# Patient Record
Sex: FEMALE | Race: WHITE | NOT HISPANIC OR LATINO | Employment: UNEMPLOYED | URBAN - METROPOLITAN AREA
[De-identification: names, ages, dates, MRNs, and addresses within clinical notes are randomized per-mention and may not be internally consistent; named-entity substitution may affect disease eponyms.]

---

## 2019-12-18 ENCOUNTER — OFFICE VISIT (OUTPATIENT)
Dept: OBSTETRICS AND GYNECOLOGY | Age: 29
End: 2019-12-18

## 2019-12-18 ENCOUNTER — PROCEDURE VISIT (OUTPATIENT)
Dept: OBSTETRICS AND GYNECOLOGY | Age: 29
End: 2019-12-18

## 2019-12-18 VITALS
HEIGHT: 63 IN | BODY MASS INDEX: 27.46 KG/M2 | WEIGHT: 155 LBS | DIASTOLIC BLOOD PRESSURE: 90 MMHG | SYSTOLIC BLOOD PRESSURE: 120 MMHG

## 2019-12-18 DIAGNOSIS — Z34.80 SUPERVISION OF OTHER NORMAL PREGNANCY, ANTEPARTUM: ICD-10-CM

## 2019-12-18 DIAGNOSIS — Z01.419 WELL WOMAN EXAM WITH ROUTINE GYNECOLOGICAL EXAM: Primary | ICD-10-CM

## 2019-12-18 DIAGNOSIS — O36.80X0 ENCOUNTER TO DETERMINE FETAL VIABILITY OF PREGNANCY, SINGLE OR UNSPECIFIED FETUS: Primary | ICD-10-CM

## 2019-12-18 PROBLEM — O09.299: Status: ACTIVE | Noted: 2019-12-18

## 2019-12-18 PROBLEM — O09.299 CURRENT SINGLETON PREGNANCY WITH HISTORY OF CONGENITAL ANOMALY IN PRIOR CHILD, ANTEPARTUM: Status: ACTIVE | Noted: 2019-12-18

## 2019-12-18 PROCEDURE — 99213 OFFICE O/P EST LOW 20 MIN: CPT | Performed by: NURSE PRACTITIONER

## 2019-12-18 PROCEDURE — 99385 PREV VISIT NEW AGE 18-39: CPT | Performed by: NURSE PRACTITIONER

## 2019-12-18 PROCEDURE — 76817 TRANSVAGINAL US OBSTETRIC: CPT | Performed by: OBSTETRICS & GYNECOLOGY

## 2019-12-18 RX ORDER — PRENATAL VIT NO.126/IRON/FOLIC 28MG-0.8MG
TABLET ORAL DAILY
COMMUNITY
End: 2020-08-25

## 2019-12-18 NOTE — PROGRESS NOTES
Subjective     Chief Complaint   Patient presents with   • Establish Care     New Patient. + HPT. US today.        History of Present Illness    Berry Daniels is a 29 y.o. female who presents for annual exam and confirmation of pregnancy as a new patient.     US confirms IUP at 9 weeks 3 days. . This is Berry's third pregnancy. Hx   and SAB - calling for 2013 delivery note. Berry reports the pregnancy as uncomplicated. She reports shoulder dystocia with the delivery. Her son was diagnosed at age 3 with congenital meckel's diverticulum.    Berry denies any medical hx. She is taking only a prenatal vitamin.   Her blood pressure is mildly elevated today- Berry denies hx of high blood pressure. She reports she is not feeling well today- nausea pretty bad. Declines phenergan. Recommend unisom and B6. Will monitor BP closely.     Obstetric History:  OB History        3    Para   1    Term   1            AB   1    Living   1       SAB   1    TAB        Ectopic        Molar        Multiple        Live Births   1               Menstrual History:     Patient's last menstrual period was 10/13/2019.         Social History    Tobacco Use      Smoking status: Former Smoker        Types: Electronic Cigarette      Smokeless tobacco: Never Used      Tobacco comment: 1 year ago quit cigs    Exercise: moderately active  Calcium/Vitamin D: adequate intake    The following portions of the patient's history were reviewed and updated as appropriate: allergies, current medications, past family history, past medical history, past social history, past surgical history and problem list.    Review of Systems   Constitutional: Negative.    HENT: Negative.    Eyes: Negative for visual disturbance.   Respiratory: Negative for cough, shortness of breath and wheezing.    Cardiovascular: Negative for chest pain, palpitations and leg swelling.   Gastrointestinal: Negative for abdominal distention, abdominal pain, blood in  "stool, constipation, diarrhea, nausea and vomiting.   Endocrine: Negative for cold intolerance and heat intolerance.   Genitourinary: Negative for difficulty urinating, dyspareunia, dysuria, frequency, genital sores, hematuria, menstrual problem, pelvic pain, urgency, vaginal bleeding, vaginal discharge and vaginal pain.   Musculoskeletal: Negative.    Skin: Negative.    Neurological: Negative for dizziness, weakness, light-headedness, numbness and headaches.   Hematological: Negative.    Psychiatric/Behavioral: Negative.    Breasts: negative for lumps skin changes, dimpling, swelling, nipple changes/discharge bilaterally         Objective   Physical Exam    /90   Ht 160 cm (63\")   Wt 70.3 kg (155 lb)   LMP 10/13/2019   BMI 27.46 kg/m²     General:   alert, appears stated age and cooperative       Heart: regular rate and rhythm, S1, S2 normal, no murmur, click, rub or gallop   Lungs: clear to auscultation bilaterally   Abdomen: soft, non-tender, without masses or organomegaly   Breast: inspection negative, no nipple discharge or bleeding, no masses or nodularity palpable   Vulva: normal   Vagina: normal mucosa, normal discharge   Cervix: no cervical motion tenderness and no lesions   Uterus: size consistent with 9 weeks   Adnexa: no mass, fullness, tenderness         Assessment/Plan   Berry was seen today for establish care.    Diagnoses and all orders for this visit:    Well woman exam with routine gynecological exam    Supervision of other normal pregnancy, antepartum  -     ABO / Rh  -     Antibody Screen  -     CBC (No Diff)  -     Hemoglobin A1c  -     Hepatitis C Antibody  -     Hepatitis B Surface Antigen  -     Hgb. Frac. Profile  -     HIV-1 / O / 2 Ag / Antibody 4th Generation  -     RPR  -     Rubella Antibody, IgG  -     Varicella Zoster Antibody, IgG  -     Urine Culture - Urine, Urine, Clean Catch  -     Comprehensive Metabolic Panel        All questions answered.  Await pap smear " results.  Breast self exam technique reviewed and patient encouraged to perform self-exam monthly.  Discussed healthy lifestyle modifications.   Prenatal labs and pap today.    Return for BP check in 2 weeks.   OB intake 4 weeks.    MARILEE Torre

## 2019-12-19 LAB
ABO GROUP BLD: NORMAL
ALBUMIN SERPL-MCNC: 4.7 G/DL (ref 3.5–5.5)
ALBUMIN/GLOB SERPL: 1.8 {RATIO} (ref 1.2–2.2)
ALP SERPL-CCNC: 50 IU/L (ref 39–117)
ALT SERPL-CCNC: 7 IU/L (ref 0–32)
AST SERPL-CCNC: 15 IU/L (ref 0–40)
BACTERIA UR CULT: NO GROWTH
BACTERIA UR CULT: NORMAL
BILIRUB SERPL-MCNC: 0.4 MG/DL (ref 0–1.2)
BLD GP AB SCN SERPL QL: NEGATIVE
BUN SERPL-MCNC: 10 MG/DL (ref 6–20)
BUN/CREAT SERPL: 15 (ref 9–23)
CALCIUM SERPL-MCNC: 9.4 MG/DL (ref 8.7–10.2)
CHLORIDE SERPL-SCNC: 100 MMOL/L (ref 96–106)
CO2 SERPL-SCNC: 20 MMOL/L (ref 20–29)
CREAT SERPL-MCNC: 0.65 MG/DL (ref 0.57–1)
ERYTHROCYTE [DISTWIDTH] IN BLOOD BY AUTOMATED COUNT: 13.7 % (ref 12.3–15.4)
GLOBULIN SER CALC-MCNC: 2.6 G/DL (ref 1.5–4.5)
GLUCOSE SERPL-MCNC: 77 MG/DL (ref 65–99)
HBA1C MFR BLD: 5.3 % (ref 4.8–5.6)
HBV SURFACE AG SERPL QL IA: NEGATIVE
HCT VFR BLD AUTO: 39.1 % (ref 34–46.6)
HCV AB S/CO SERPL IA: <0.1 S/CO RATIO (ref 0–0.9)
HGB A MFR BLD: 97.5 % (ref 96.4–98.8)
HGB A2 MFR BLD COLUMN CHROM: 1.9 % (ref 1.8–3.2)
HGB BLD-MCNC: 13.3 G/DL (ref 11.1–15.9)
HGB C MFR BLD: 0 %
HGB F MFR BLD: 0.6 % (ref 0–2)
HGB FRACT BLD-IMP: NORMAL
HGB S BLD QL SOLY: NEGATIVE
HGB S MFR BLD: 0 %
HIV 1+2 AB+HIV1 P24 AG SERPL QL IA: NON REACTIVE
MCH RBC QN AUTO: 31 PG (ref 26.6–33)
MCHC RBC AUTO-ENTMCNC: 34 G/DL (ref 31.5–35.7)
MCV RBC AUTO: 91 FL (ref 79–97)
PLATELET # BLD AUTO: 279 X10E3/UL (ref 150–450)
POTASSIUM SERPL-SCNC: 3.7 MMOL/L (ref 3.5–5.2)
PROT SERPL-MCNC: 7.3 G/DL (ref 6–8.5)
RBC # BLD AUTO: 4.29 X10E6/UL (ref 3.77–5.28)
RH BLD: POSITIVE
RPR SER QL: REACTIVE
RPR SER-TITR: ABNORMAL {TITER}
RUBV IGG SERPL IA-ACNC: 10.4 INDEX
SODIUM SERPL-SCNC: 137 MMOL/L (ref 134–144)
VZV IGG SER IA-ACNC: 256 INDEX
WBC # BLD AUTO: 11.6 X10E3/UL (ref 3.4–10.8)

## 2019-12-20 DIAGNOSIS — A53.0 POSITIVE RPR TEST: Primary | ICD-10-CM

## 2019-12-20 PROBLEM — R76.8 BIOLOGICAL FALSE POSITIVE RPR TEST: Status: ACTIVE | Noted: 2019-12-20

## 2019-12-20 LAB
A VAGINAE DNA VAG QL NAA+PROBE: ABNORMAL SCORE
BVAB2 DNA VAG QL NAA+PROBE: ABNORMAL SCORE
C ALBICANS DNA VAG QL NAA+PROBE: NEGATIVE
C GLABRATA DNA VAG QL NAA+PROBE: NEGATIVE
C TRACH RRNA SPEC QL NAA+PROBE: NEGATIVE
MEGA1 DNA VAG QL NAA+PROBE: ABNORMAL SCORE
N GONORRHOEA RRNA SPEC QL NAA+PROBE: NEGATIVE
T VAGINALIS RRNA SPEC QL NAA+PROBE: NEGATIVE

## 2019-12-21 LAB
CONV .: NORMAL
CYTOLOGIST CVX/VAG CYTO: NORMAL
CYTOLOGY CVX/VAG DOC CYTO: NORMAL
CYTOLOGY CVX/VAG DOC THIN PREP: NORMAL
DX ICD CODE: NORMAL
HIV 1 & 2 AB SER-IMP: NORMAL
OTHER STN SPEC: NORMAL
STAT OF ADQ CVX/VAG CYTO-IMP: NORMAL

## 2019-12-23 ENCOUNTER — TELEPHONE (OUTPATIENT)
Dept: OBSTETRICS AND GYNECOLOGY | Age: 29
End: 2019-12-23

## 2019-12-23 PROBLEM — N76.0 BV (BACTERIAL VAGINOSIS): Status: ACTIVE | Noted: 2019-12-23

## 2019-12-23 PROBLEM — B96.89 BV (BACTERIAL VAGINOSIS): Status: ACTIVE | Noted: 2019-12-23

## 2019-12-23 RX ORDER — METRONIDAZOLE 500 MG/1
500 TABLET ORAL 2 TIMES DAILY
Qty: 14 TABLET | Refills: 0 | Status: SHIPPED | OUTPATIENT
Start: 2019-12-23 | End: 2019-12-30

## 2019-12-23 NOTE — TELEPHONE ENCOUNTER
Pt returned call. Informed of BV on culture. Flagyl sent to pharm. Advised no alcohol.  Also discussed need for additional labs- pt scheduled for 12/27.

## 2019-12-23 NOTE — TELEPHONE ENCOUNTER
Attempted to call patient with lab results- tried both numbers in chart- voicemail box not set up on either phone.

## 2019-12-25 ENCOUNTER — RESULTS ENCOUNTER (OUTPATIENT)
Dept: OBSTETRICS AND GYNECOLOGY | Age: 29
End: 2019-12-25

## 2019-12-25 DIAGNOSIS — A53.0 POSITIVE RPR TEST: ICD-10-CM

## 2019-12-31 LAB
APTT SCREEN TO CONFIRM RATIO: 0.95 RATIO (ref 0–1.4)
CARDIOLIPIN IGG SER IA-ACNC: <9 GPL U/ML (ref 0–14)
CARDIOLIPIN IGM SER IA-ACNC: <9 MPL U/ML (ref 0–12)
CONFIRM APTT/NORMAL: 28.4 SEC (ref 0–55)
ENA SS-A AB SER-ACNC: <0.2 AI (ref 0–0.9)
ENA SS-B AB SER-ACNC: <0.2 AI (ref 0–0.9)
LA 2 SCREEN W REFLEX-IMP: NORMAL
SCREEN APTT: 30.4 SEC (ref 0–51.9)
SCREEN DRVVT: 26.9 SEC (ref 0–47)
T PALLIDUM AB SER QL IF: ABNORMAL
THROMBIN TIME: 15.4 SEC (ref 0–23)

## 2020-01-02 ENCOUNTER — TELEPHONE (OUTPATIENT)
Dept: OBSTETRICS AND GYNECOLOGY | Age: 30
End: 2020-01-02

## 2020-01-02 NOTE — TELEPHONE ENCOUNTER
Returned Leola Estrada's call [facility]. Advised her that the patient will be having repeat labs tomorrow. Leola states she will follow up with us regarding those labs next week.

## 2020-01-02 NOTE — TELEPHONE ENCOUNTER
[facility] Dept.calling to find out if patient was treated for RPR and if she had a previous exposure?.Please advise. They can be reached @[phone],Ask for Leola Estrada.

## 2020-01-02 NOTE — TELEPHONE ENCOUNTER
Discussed recent labs results in detail and meaning of reactive RPR with such low titer. FTA is equivocal so RPR and FTA need to be repeated. She will get this done at her appointment tomorrow. Confirmed she has no past history or exposure. Her questions were answered.

## 2020-01-03 ENCOUNTER — OFFICE VISIT (OUTPATIENT)
Dept: OBSTETRICS AND GYNECOLOGY | Age: 30
End: 2020-01-03

## 2020-01-03 VITALS
SYSTOLIC BLOOD PRESSURE: 120 MMHG | WEIGHT: 162 LBS | DIASTOLIC BLOOD PRESSURE: 80 MMHG | BODY MASS INDEX: 28.7 KG/M2 | HEIGHT: 63 IN

## 2020-01-03 DIAGNOSIS — Z3A.11 11 WEEKS GESTATION OF PREGNANCY: Primary | ICD-10-CM

## 2020-01-03 DIAGNOSIS — A53.0 POSITIVE RPR TEST: ICD-10-CM

## 2020-01-03 PROCEDURE — 99212 OFFICE O/P EST SF 10 MIN: CPT | Performed by: NURSE PRACTITIONER

## 2020-01-03 NOTE — PROGRESS NOTES
Subjective   Berry Daniels is a 29 y.o. female.     History of Present Illness     Berry presents for follow up +RPR with equivocal FTA. Labs will be repeated today. Counseling provided and Berry's questions were answered.     No complaints or concerns today.  BP WNL.   on doppler     The following portions of the patient's history were reviewed and updated as appropriate: allergies, current medications, past family history, past medical history, past social history, past surgical history and problem list.    Review of Systems   Constitutional: Negative for chills, fatigue and fever.   Gastrointestinal: Negative for abdominal distention and abdominal pain.   Genitourinary: Negative for dysuria, frequency, genital sores, hematuria, menstrual problem, pelvic pain, pelvic pressure, urgency, vaginal bleeding, vaginal discharge and vaginal pain.       Objective   Physical Exam   Constitutional: She is oriented to person, place, and time. She appears well-developed and well-nourished. No distress.   Neurological: She is alert and oriented to person, place, and time.   Skin: Skin is warm and dry.   Psychiatric: She has a normal mood and affect. Her behavior is normal.       Assessment/Plan   Berry was seen today for follow-up.    Diagnoses and all orders for this visit:    11 weeks gestation of pregnancy    Positive RPR test  -     RPR  -     T Pallidum Antibody (FTA-Ab)      Call Monday for results. Follow up 2 weeks for OB intake appointment.    MARILEE Torre

## 2020-01-07 LAB
RPR SER QL: NORMAL
T PALLIDUM AB SER QL IF: NON REACTIVE

## 2020-01-08 ENCOUNTER — TELEPHONE (OUTPATIENT)
Dept: OBSTETRICS AND GYNECOLOGY | Age: 30
End: 2020-01-08

## 2020-01-13 ENCOUNTER — TELEPHONE (OUTPATIENT)
Dept: OBSTETRICS AND GYNECOLOGY | Age: 30
End: 2020-01-13

## 2020-01-13 NOTE — TELEPHONE ENCOUNTER
PT CALLED AND STATES SHE HAS HAD SOME LIGHT SPOTTING. LIGHT PINK. NOT HEAVY. STATES NO CRAMPING. SHE SAID SHE HAS HAD MISCARRIAGE IN THE PAST. ADVISED HER TO GO TO [facility] ER. STATES SHE IS GOING TO REST AND WILL GO THERE IF IT CONTINUES OR GETS WORSE.  CIARAN

## 2020-01-22 ENCOUNTER — INITIAL PRENATAL (OUTPATIENT)
Dept: OBSTETRICS AND GYNECOLOGY | Age: 30
End: 2020-01-22

## 2020-01-22 VITALS — DIASTOLIC BLOOD PRESSURE: 78 MMHG | WEIGHT: 159 LBS | SYSTOLIC BLOOD PRESSURE: 112 MMHG | BODY MASS INDEX: 28.17 KG/M2

## 2020-01-22 DIAGNOSIS — O09.299: ICD-10-CM

## 2020-01-22 DIAGNOSIS — O46.8X1 SUBCHORIONIC HEMORRHAGE OF PLACENTA IN FIRST TRIMESTER, SINGLE OR UNSPECIFIED FETUS: ICD-10-CM

## 2020-01-22 DIAGNOSIS — O41.8X10 SUBCHORIONIC HEMORRHAGE OF PLACENTA IN FIRST TRIMESTER, SINGLE OR UNSPECIFIED FETUS: ICD-10-CM

## 2020-01-22 DIAGNOSIS — Z13.79 ENCOUNTER FOR GENETIC SCREENING FOR DOWN SYNDROME: ICD-10-CM

## 2020-01-22 DIAGNOSIS — Z3A.14 14 WEEKS GESTATION OF PREGNANCY: Primary | ICD-10-CM

## 2020-01-22 DIAGNOSIS — O09.299 HISTORY OF SHOULDER DYSTOCIA IN PRIOR PREGNANCY, CURRENTLY PREGNANT: ICD-10-CM

## 2020-01-22 DIAGNOSIS — Z34.80 SUPERVISION OF OTHER NORMAL PREGNANCY, ANTEPARTUM: ICD-10-CM

## 2020-01-22 DIAGNOSIS — R30.0 DYSURIA: ICD-10-CM

## 2020-01-22 DIAGNOSIS — Z13.89 SCREENING FOR BLOOD OR PROTEIN IN URINE: ICD-10-CM

## 2020-01-22 DIAGNOSIS — O09.299 CURRENT SINGLETON PREGNANCY WITH HISTORY OF CONGENITAL ANOMALY IN PRIOR CHILD, ANTEPARTUM: ICD-10-CM

## 2020-01-22 DIAGNOSIS — A53.0 POSITIVE RPR TEST: ICD-10-CM

## 2020-01-22 PROBLEM — B96.89 BV (BACTERIAL VAGINOSIS): Status: RESOLVED | Noted: 2019-12-23 | Resolved: 2020-01-22

## 2020-01-22 PROBLEM — N76.0 BV (BACTERIAL VAGINOSIS): Status: RESOLVED | Noted: 2019-12-23 | Resolved: 2020-01-22

## 2020-01-22 LAB
BILIRUB BLD-MCNC: NEGATIVE MG/DL
CLARITY, POC: CLEAR
COLOR UR: YELLOW
EXTERNAL CYSTIC FIBROSIS: NEGATIVE
EXTERNAL NIPT: NEGATIVE
GLUCOSE UR STRIP-MCNC: NEGATIVE MG/DL
KETONES UR QL: ABNORMAL
LEUKOCYTE EST, POC: ABNORMAL
NITRITE UR-MCNC: NEGATIVE MG/ML
PH UR: 6 [PH] (ref 5–8)
PROT UR STRIP-MCNC: ABNORMAL MG/DL
RBC # UR STRIP: ABNORMAL /UL
SP GR UR: 1.03 (ref 1–1.03)
UROBILINOGEN UR QL: NORMAL

## 2020-01-22 PROCEDURE — 99214 OFFICE O/P EST MOD 30 MIN: CPT | Performed by: OBSTETRICS & GYNECOLOGY

## 2020-01-22 NOTE — PROGRESS NOTES
Chief Complaint   Patient presents with   • Routine Prenatal Visit     OB INTAKE.  Called last week  for spotting, passed tissue??  Lasted day of call and next morning.  This morning noticed spotting again.  Questioned UTI, since Saturday has been having bladder spasms, odor, painful urination, not empyting. Symptoms come and go.        HPI: 29 y.o.  at 14w3d with complaint of intermittent moderate dark brown bleeding for the past week.  The patient was previously diagnosed with a small subchorionic hemorrhage.    The patient reports a significant shoulder dystocia with her prior delivery.  The infant suffered from a fractured clavicle as a result of the delivery.  We discussed the biggest predictor of recurrent shoulder dystocia being previous shoulder dystocia.   delivery is recommended at term    The patient is also reporting intermittent moderate symptoms of  dysuria of the past few days.              Vitals:    20 1050   BP: 112/78   Weight: 72.1 kg (159 lb)       ROS:  GI:  Negative  : dysuria and spotting   Pulmonary: Negative     A/P  1. Intrauterine pregnancy at 14w3d   2. Pregnancy Risk:  NORMAL    Berry was seen today for routine prenatal visit.    Diagnoses and all orders for this visit:    14 weeks gestation of pregnancy  -     POC Urinalysis Dipstick    Screening for blood or protein in urine  -     POC Urinalysis Dipstick    Positive RPR test    Supervision of other normal pregnancy, antepartum    Current mcclellan pregnancy with history of congenital anomaly in prior child, antepartum    Hx of shoulder dystocia, prior pregnancy, currently pregnant    History of shoulder dystocia in prior pregnancy, currently pregnant    Subchorionic hemorrhage of placenta in first trimester, single or unspecified fetus    Delivery by planned , 37-39 wks due to labor, with postp compl    Encounter for genetic screening for Down Syndrome    Dysuria      Discussed previous shoulder  dystocia.  Strongly recommend  at term.  For the symptoms of dysuria urine culture will be sent.  Discussed subchorionic hemorrhage at length.  Discussed false positive RPR and negative testing for other diseases.      -----------------------  PLAN:   Return in about 4 weeks (around 2020) for Anatomic survey and OB check.      Oliverio Estrada MD  2020 11:14 AM

## 2020-01-25 LAB
BACTERIA UR CULT: ABNORMAL
BACTERIA UR CULT: ABNORMAL
OTHER ANTIBIOTIC SUSC ISLT: ABNORMAL

## 2020-01-25 RX ORDER — NITROFURANTOIN 25; 75 MG/1; MG/1
100 CAPSULE ORAL 2 TIMES DAILY
Qty: 10 CAPSULE | Refills: 0 | Status: SHIPPED | OUTPATIENT
Start: 2020-01-25 | End: 2020-01-30

## 2020-01-27 ENCOUNTER — TELEPHONE (OUTPATIENT)
Dept: OBSTETRICS AND GYNECOLOGY | Age: 30
End: 2020-01-27

## 2020-01-27 NOTE — TELEPHONE ENCOUNTER
----- Message from Oliverio Estrada MD sent at 1/25/2020  8:32 AM EST -----  Notify patient:  Urine culture is positive.  Antibiotic sent to pharmacy

## 2020-01-27 NOTE — TELEPHONE ENCOUNTER
Pt notified of results and understanding verbalized.  Urine culture is positive.  Antibiotic sent to pharmacy.

## 2020-01-28 ENCOUNTER — TELEPHONE (OUTPATIENT)
Dept: OBSTETRICS AND GYNECOLOGY | Age: 30
End: 2020-01-28

## 2020-01-29 ENCOUNTER — TELEPHONE (OUTPATIENT)
Dept: OBSTETRICS AND GYNECOLOGY | Age: 30
End: 2020-01-29

## 2020-01-29 PROBLEM — Z13.79 GENETIC SCREENING: Status: ACTIVE | Noted: 2020-01-29

## 2020-02-19 ENCOUNTER — PROCEDURE VISIT (OUTPATIENT)
Dept: OBSTETRICS AND GYNECOLOGY | Age: 30
End: 2020-02-19

## 2020-02-19 ENCOUNTER — ROUTINE PRENATAL (OUTPATIENT)
Dept: OBSTETRICS AND GYNECOLOGY | Age: 30
End: 2020-02-19

## 2020-02-19 VITALS — WEIGHT: 164 LBS | DIASTOLIC BLOOD PRESSURE: 66 MMHG | SYSTOLIC BLOOD PRESSURE: 104 MMHG | BODY MASS INDEX: 29.05 KG/M2

## 2020-02-19 DIAGNOSIS — Z36.86 ENCOUNTER FOR ANTENATAL SCREENING FOR CERVICAL LENGTH: ICD-10-CM

## 2020-02-19 DIAGNOSIS — Z34.80 SUPERVISION OF OTHER NORMAL PREGNANCY, ANTEPARTUM: Primary | ICD-10-CM

## 2020-02-19 DIAGNOSIS — Z36.89 SCREENING, ANTENATAL, FOR FETAL ANATOMIC SURVEY: Primary | ICD-10-CM

## 2020-02-19 DIAGNOSIS — Z13.89 SCREENING FOR BLOOD OR PROTEIN IN URINE: ICD-10-CM

## 2020-02-19 DIAGNOSIS — B96.20 E-COLI UTI: ICD-10-CM

## 2020-02-19 DIAGNOSIS — N39.0 E-COLI UTI: ICD-10-CM

## 2020-02-19 PROBLEM — O44.42 LOW-LYING PLACENTA IN SECOND TRIMESTER: Status: ACTIVE | Noted: 2020-02-19

## 2020-02-19 PROBLEM — IMO0002 EVALUATE ANATOMY NOT SEEN ON PRIOR SONOGRAM: Status: ACTIVE | Noted: 2020-02-19

## 2020-02-19 LAB
BILIRUB BLD-MCNC: NEGATIVE MG/DL
CLARITY, POC: CLEAR
COLOR UR: YELLOW
GLUCOSE UR STRIP-MCNC: NEGATIVE MG/DL
KETONES UR QL: NEGATIVE
LEUKOCYTE EST, POC: ABNORMAL
NITRITE UR-MCNC: NEGATIVE MG/ML
PH UR: 7.5 [PH] (ref 5–8)
PROT UR STRIP-MCNC: NEGATIVE MG/DL
RBC # UR STRIP: ABNORMAL /UL
SP GR UR: 1.02 (ref 1–1.03)
UROBILINOGEN UR QL: NORMAL

## 2020-02-19 PROCEDURE — 99213 OFFICE O/P EST LOW 20 MIN: CPT | Performed by: NURSE PRACTITIONER

## 2020-02-19 PROCEDURE — 76805 OB US >/= 14 WKS SNGL FETUS: CPT | Performed by: OBSTETRICS & GYNECOLOGY

## 2020-02-19 PROCEDURE — 76817 TRANSVAGINAL US OBSTETRIC: CPT | Performed by: OBSTETRICS & GYNECOLOGY

## 2020-02-19 NOTE — PROGRESS NOTES
Chief Complaint   Patient presents with   • Routine Prenatal Visit     Some spotting this past Monday which has happened before. Nothing since.        HPI: 29 y.o.  at 18w3d presents for routine OB visit.     Anatomy scan normal but incomplete today. CL= 5.6. Low lying placenta.   Berry reports one more episode of light spotting several days ago that has since resolved.  Recommend pelvic rest at this time.  Discussed AFP screening today for open neural tube defects and Berry desires this screening.    Berry reports compliance with macrobid therapy and symptoms of UTI have resolved- will recheck urine culture.    Vitals:    20 0910   BP: 104/66   Weight: 74.4 kg (164 lb)       ROS:  GI:  Negative  : Negative  Pulmonary: Negative     A/P  1. Intrauterine pregnancy at 18w3d   2. Pregnancy Risk:  COMPLICATED    Berry was seen today for routine prenatal visit.    Diagnoses and all orders for this visit:    Supervision of other normal pregnancy, antepartum    Screening for blood or protein in urine  -     POC Urinalysis Dipstick    E-coli UTI  -     Urine Culture - Urine, Urine, Clean Catch      PLAN:     4 week OB visit  Call for further episodes of bleeding or other changes/concerns      Corina Jackson, APRN  2020 9:17 AM

## 2020-02-21 LAB
BACTERIA UR CULT: NO GROWTH
BACTERIA UR CULT: NORMAL

## 2020-02-22 LAB
AFP ADJ MOM SERPL: 0.81
AFP INTERP SERPL-IMP: NORMAL
AFP INTERP SERPL-IMP: NORMAL
AFP SERPL-MCNC: 33.1 NG/ML
AGE AT DELIVERY: 29.3 YR
GA METHOD: NORMAL
GA: 18 WEEKS
IDDM PATIENT QL: NO
LABORATORY COMMENT REPORT: NORMAL
MULTIPLE PREGNANCY: NO
NEURAL TUBE DEFECT RISK FETUS: NORMAL %
RESULT: NORMAL

## 2020-02-24 ENCOUNTER — TELEPHONE (OUTPATIENT)
Dept: OBSTETRICS AND GYNECOLOGY | Age: 30
End: 2020-02-24

## 2020-02-24 NOTE — TELEPHONE ENCOUNTER
----- Message from MARILEE Louie sent at 2/24/2020  8:25 AM EST -----  Please notify patient her AFP screening for open neural tube defects returned negative.

## 2020-03-13 ENCOUNTER — TELEPHONE (OUTPATIENT)
Dept: OBSTETRICS AND GYNECOLOGY | Age: 30
End: 2020-03-13

## 2020-03-13 RX ORDER — ONDANSETRON HYDROCHLORIDE 8 MG/1
8 TABLET, FILM COATED ORAL EVERY 8 HOURS PRN
Qty: 15 TABLET | Refills: 1 | Status: SHIPPED | OUTPATIENT
Start: 2020-03-13 | End: 2020-07-14 | Stop reason: HOSPADM

## 2020-03-13 NOTE — TELEPHONE ENCOUNTER
Patient called and has been unable to keep anything down for 2 days.  Advised patient that if vomiting doesn't stop she would need to go to labor and delivery to be evaluated.  She is wanting to know if she can get a refill of Zofran sent over to her pharmacy.

## 2020-03-18 ENCOUNTER — PROCEDURE VISIT (OUTPATIENT)
Dept: OBSTETRICS AND GYNECOLOGY | Age: 30
End: 2020-03-18

## 2020-03-18 ENCOUNTER — ROUTINE PRENATAL (OUTPATIENT)
Dept: OBSTETRICS AND GYNECOLOGY | Age: 30
End: 2020-03-18

## 2020-03-18 VITALS — DIASTOLIC BLOOD PRESSURE: 66 MMHG | WEIGHT: 171 LBS | BODY MASS INDEX: 30.29 KG/M2 | SYSTOLIC BLOOD PRESSURE: 106 MMHG

## 2020-03-18 DIAGNOSIS — Z34.80 SUPERVISION OF OTHER NORMAL PREGNANCY, ANTEPARTUM: Primary | ICD-10-CM

## 2020-03-18 DIAGNOSIS — Z13.89 SCREENING FOR BLOOD OR PROTEIN IN URINE: ICD-10-CM

## 2020-03-18 DIAGNOSIS — O44.42 LOW-LYING PLACENTA IN SECOND TRIMESTER: ICD-10-CM

## 2020-03-18 DIAGNOSIS — O44.42 LOW-LYING PLACENTA IN SECOND TRIMESTER: Primary | ICD-10-CM

## 2020-03-18 DIAGNOSIS — IMO0002 EVALUATE ANATOMY NOT SEEN ON PRIOR SONOGRAM: ICD-10-CM

## 2020-03-18 PROBLEM — O26.852 SPOTTING AFFECTING PREGNANCY IN SECOND TRIMESTER: Status: ACTIVE | Noted: 2020-03-18

## 2020-03-18 LAB
BILIRUB BLD-MCNC: NEGATIVE MG/DL
CLARITY, POC: CLEAR
COLOR UR: YELLOW
GLUCOSE UR STRIP-MCNC: NEGATIVE MG/DL
KETONES UR QL: NEGATIVE
LEUKOCYTE EST, POC: NEGATIVE
NITRITE UR-MCNC: NEGATIVE MG/ML
PH UR: 8.5 [PH] (ref 5–8)
PROT UR STRIP-MCNC: NEGATIVE MG/DL
RBC # UR STRIP: NEGATIVE /UL
SP GR UR: 1.02 (ref 1–1.03)
UROBILINOGEN UR QL: NORMAL

## 2020-03-18 PROCEDURE — 76816 OB US FOLLOW-UP PER FETUS: CPT | Performed by: OBSTETRICS & GYNECOLOGY

## 2020-03-18 PROCEDURE — 76817 TRANSVAGINAL US OBSTETRIC: CPT | Performed by: OBSTETRICS & GYNECOLOGY

## 2020-03-18 PROCEDURE — 99213 OFFICE O/P EST LOW 20 MIN: CPT | Performed by: NURSE PRACTITIONER

## 2020-03-18 NOTE — PROGRESS NOTES
Chief Complaint   Patient presents with   • Routine Prenatal Visit     Pt states some light pink spotting this morning but denies cramping.        HPI: 30 y.o.  at 22w3d presents for routine OB visit.    Berry c/o one episode of light spotting this am with wiping. Denies cramping, pain. Feeling well otherwise.   US obtained. Anatomy normal and complete today. Cervical length appears normal at 3.9 cm with a visible lower uterine contraction. Dr. Estrada discussed findings with patient. Plan to repeat US cervical length 1 week.      Vitals:    20 1111   BP: 106/66   Weight: 77.6 kg (171 lb)       ROS: negative    A/P  1. Intrauterine pregnancy at 22w3d   2. Pregnancy Risk:  COMPLICATED    Berry was seen today for routine prenatal visit.    Diagnoses and all orders for this visit:    Supervision of other normal pregnancy, antepartum    Screening for blood or protein in urine  -     POC Urinalysis Dipstick    Low-lying placenta in second trimester        PLAN:     Dr. Estrada aware of case and recommends repeat US 1 week.   Berry is advised to call with any changes or concerns or recurrent spotting.  She will push fluids and continue pelvic rest    Corina Jackson, APRN  3/18/2020 14:33

## 2020-03-25 ENCOUNTER — PROCEDURE VISIT (OUTPATIENT)
Dept: OBSTETRICS AND GYNECOLOGY | Age: 30
End: 2020-03-25

## 2020-03-25 ENCOUNTER — ROUTINE PRENATAL (OUTPATIENT)
Dept: OBSTETRICS AND GYNECOLOGY | Age: 30
End: 2020-03-25

## 2020-03-25 VITALS — BODY MASS INDEX: 29.94 KG/M2 | SYSTOLIC BLOOD PRESSURE: 102 MMHG | WEIGHT: 169 LBS | DIASTOLIC BLOOD PRESSURE: 76 MMHG

## 2020-03-25 DIAGNOSIS — O26.852 SPOTTING AFFECTING PREGNANCY IN SECOND TRIMESTER: Primary | ICD-10-CM

## 2020-03-25 DIAGNOSIS — O26.899 CRAMPING AFFECTING PREGNANCY, ANTEPARTUM: ICD-10-CM

## 2020-03-25 DIAGNOSIS — Z13.89 SCREENING FOR BLOOD OR PROTEIN IN URINE: ICD-10-CM

## 2020-03-25 DIAGNOSIS — Z3A.23 23 WEEKS GESTATION OF PREGNANCY: Primary | ICD-10-CM

## 2020-03-25 DIAGNOSIS — R10.9 CRAMPING AFFECTING PREGNANCY, ANTEPARTUM: ICD-10-CM

## 2020-03-25 DIAGNOSIS — Z34.80 SUPERVISION OF OTHER NORMAL PREGNANCY, ANTEPARTUM: ICD-10-CM

## 2020-03-25 PROBLEM — IMO0002 EVALUATE ANATOMY NOT SEEN ON PRIOR SONOGRAM: Status: RESOLVED | Noted: 2020-02-19 | Resolved: 2020-03-25

## 2020-03-25 PROBLEM — O44.42 LOW-LYING PLACENTA IN SECOND TRIMESTER: Status: RESOLVED | Noted: 2020-02-19 | Resolved: 2020-03-25

## 2020-03-25 LAB
BILIRUB BLD-MCNC: NEGATIVE MG/DL
CLARITY, POC: CLEAR
COLOR UR: YELLOW
GLUCOSE UR STRIP-MCNC: NEGATIVE MG/DL
KETONES UR QL: ABNORMAL
LEUKOCYTE EST, POC: ABNORMAL
NITRITE UR-MCNC: NEGATIVE MG/ML
PH UR: 8.5 [PH] (ref 5–8)
PROT UR STRIP-MCNC: NEGATIVE MG/DL
RBC # UR STRIP: ABNORMAL /UL
SP GR UR: 1.02 (ref 1–1.03)
UROBILINOGEN UR QL: NORMAL

## 2020-03-25 PROCEDURE — 99213 OFFICE O/P EST LOW 20 MIN: CPT | Performed by: OBSTETRICS & GYNECOLOGY

## 2020-03-25 PROCEDURE — 76817 TRANSVAGINAL US OBSTETRIC: CPT | Performed by: OBSTETRICS & GYNECOLOGY

## 2020-03-25 NOTE — PROGRESS NOTES
Chief Complaint   Patient presents with   • Routine Prenatal Visit     Cramping over the last week, no spotting.        HPI: 30 y.o.  at 23w3d presents for scheduled follow-up with complaint of intermittent mostly mild lower abdominal cramping provoked by activity 2-3 times a day with episodes fairly widely spaced.  Repeat ultrasound today shows a stable cervical length with contractions of the lower uterine segment noted.    Vitals:    20 1334   BP: 102/76   Weight: 76.7 kg (169 lb)       ROS:   Gen:  neg  GI: neg  CV: neg  Pul: neg   pelvic cramps   Neuro: neg    A/P  1. Intrauterine pregnancy at 23w3d   2. Pregnancy Risk:  NORMAL    Berry was seen today for routine prenatal visit.    Diagnoses and all orders for this visit:    Screening for blood or protein in urine  -     POC Urinalysis Dipstick    23 weeks gestation of pregnancy  -     POC Urinalysis Dipstick    Supervision of other normal pregnancy, antepartum        -----------------------  PLAN:   No follow-ups on file.      Oliverio Estrada MD  3/25/2020 13:46

## 2020-04-08 ENCOUNTER — ROUTINE PRENATAL (OUTPATIENT)
Dept: OBSTETRICS AND GYNECOLOGY | Age: 30
End: 2020-04-08

## 2020-04-08 VITALS — DIASTOLIC BLOOD PRESSURE: 64 MMHG | SYSTOLIC BLOOD PRESSURE: 100 MMHG | WEIGHT: 172 LBS | BODY MASS INDEX: 30.47 KG/M2

## 2020-04-08 DIAGNOSIS — Z13.89 SCREENING FOR BLOOD OR PROTEIN IN URINE: ICD-10-CM

## 2020-04-08 DIAGNOSIS — Z34.80 SUPERVISION OF OTHER NORMAL PREGNANCY, ANTEPARTUM: ICD-10-CM

## 2020-04-08 DIAGNOSIS — O26.852 SPOTTING AFFECTING PREGNANCY IN SECOND TRIMESTER: ICD-10-CM

## 2020-04-08 DIAGNOSIS — R76.8 BIOLOGICAL FALSE POSITIVE RPR TEST: ICD-10-CM

## 2020-04-08 DIAGNOSIS — Z13.1 SCREENING FOR DIABETES MELLITUS: ICD-10-CM

## 2020-04-08 DIAGNOSIS — O09.299 HISTORY OF SHOULDER DYSTOCIA IN PRIOR PREGNANCY, CURRENTLY PREGNANT: ICD-10-CM

## 2020-04-08 DIAGNOSIS — Z3A.25 25 WEEKS GESTATION OF PREGNANCY: Primary | ICD-10-CM

## 2020-04-08 DIAGNOSIS — Z13.0 SCREENING FOR IRON DEFICIENCY ANEMIA: ICD-10-CM

## 2020-04-08 LAB
BILIRUB BLD-MCNC: NEGATIVE MG/DL
BILIRUB BLD-MCNC: NEGATIVE MG/DL
CLARITY, POC: CLEAR
CLARITY, POC: CLEAR
COLOR UR: YELLOW
COLOR UR: YELLOW
GLUCOSE 1H P 50 G GLC PO SERPL-MCNC: 134 MG/DL (ref 65–179)
GLUCOSE UR STRIP-MCNC: ABNORMAL MG/DL
GLUCOSE UR STRIP-MCNC: ABNORMAL MG/DL
HCT VFR BLD AUTO: 31.6 % (ref 34–46.6)
HGB BLD-MCNC: 10.8 G/DL (ref 12–15.9)
KETONES UR QL: NEGATIVE
KETONES UR QL: NEGATIVE
LEUKOCYTE EST, POC: NEGATIVE
LEUKOCYTE EST, POC: NEGATIVE
NITRITE UR-MCNC: NEGATIVE MG/ML
NITRITE UR-MCNC: NEGATIVE MG/ML
PH UR: 6 [PH] (ref 5–8)
PH UR: 6 [PH] (ref 5–8)
PROT UR STRIP-MCNC: NEGATIVE MG/DL
PROT UR STRIP-MCNC: NEGATIVE MG/DL
RBC # UR STRIP: ABNORMAL /UL
RBC # UR STRIP: ABNORMAL /UL
SP GR UR: 1.03 (ref 1–1.03)
SP GR UR: 1.03 (ref 1–1.03)
UROBILINOGEN UR QL: NORMAL
UROBILINOGEN UR QL: NORMAL

## 2020-04-08 PROCEDURE — 90471 IMMUNIZATION ADMIN: CPT | Performed by: OBSTETRICS & GYNECOLOGY

## 2020-04-08 PROCEDURE — 99213 OFFICE O/P EST LOW 20 MIN: CPT | Performed by: OBSTETRICS & GYNECOLOGY

## 2020-04-08 PROCEDURE — 90715 TDAP VACCINE 7 YRS/> IM: CPT | Performed by: OBSTETRICS & GYNECOLOGY

## 2020-04-08 NOTE — PROGRESS NOTES
Chief Complaint   Patient presents with   • Routine Prenatal Visit     1 hour GTT       HPI: 30 y.o.  at 25w3d the patient presents for regularly scheduled OB visit with complaint of acute moderate nausea associated with consuming the 1 hour glucose tolerance test this morning.  Outside of that the patient reports feeling well without any complaints.    She previously had a false positive RPR and titers will be repeated today to ensure that nothing is changed.    Vitals:    20 0835   BP: 100/64   Weight: 78 kg (172 lb)       ROS:   Gen: neg  GI: nausea   CV: neg  Pul: neg  Neuro: neg         A/P  1. Intrauterine pregnancy at 25w3d   2. Pregnancy Risk:  NORMAL    Berry was seen today for routine prenatal visit.    Diagnoses and all orders for this visit:    25 weeks gestation of pregnancy    Screening for iron deficiency anemia  -     Hemoglobin & Hematocrit, Blood    Screening for diabetes mellitus  -     Gestational Screen 1 Hr ([facility])    Screening for blood or protein in urine  -     POC Urinalysis Dipstick  -     POC Urinalysis Dipstick    Supervision of other normal pregnancy, antepartum    Biological false positive RPR test  -     T Pallidum Antibody (FTA-Ab)  -     RPR    Delivery by planned , 37-39 wks due to labor, with postp compl    History of shoulder dystocia in prior pregnancy, currently pregnant    Spotting affecting pregnancy in second trimester    Other orders  -     Tdap Vaccine Greater Than or Equal To 6yo IM        -----------------------  PLAN:   Return in about 4 weeks (around 2020) for OB check and US for fetal growth.      Oliverio Estrada MD  2020 09:04

## 2020-04-09 DIAGNOSIS — O99.810 ABNORMAL GLUCOSE TOLERANCE TEST (GTT) DURING PREGNANCY, ANTEPARTUM: Primary | ICD-10-CM

## 2020-04-10 LAB
RPR SER QL: NORMAL
T PALLIDUM AB SER QL IF: NON REACTIVE

## 2020-04-13 ENCOUNTER — TELEPHONE (OUTPATIENT)
Dept: OBSTETRICS AND GYNECOLOGY | Age: 30
End: 2020-04-13

## 2020-04-13 NOTE — TELEPHONE ENCOUNTER
----- Message from Oliverio Estrada MD sent at 4/10/2020  5:23 PM EDT -----  Notify patient:  Repeat RPR is negative

## 2020-04-15 LAB
GLUCOSE 1H P 100 G GLC PO SERPL-MCNC: 137 MG/DL
GLUCOSE 2H P 100 G GLC PO SERPL-MCNC: 107 MG/DL
GLUCOSE 3H P 100 G GLC PO SERPL-MCNC: 113 MG/DL
GLUCOSE P FAST SERPL-MCNC: 93 MG/DL

## 2020-05-13 ENCOUNTER — ROUTINE PRENATAL (OUTPATIENT)
Dept: OBSTETRICS AND GYNECOLOGY | Age: 30
End: 2020-05-13

## 2020-05-13 ENCOUNTER — PROCEDURE VISIT (OUTPATIENT)
Dept: OBSTETRICS AND GYNECOLOGY | Age: 30
End: 2020-05-13

## 2020-05-13 VITALS — DIASTOLIC BLOOD PRESSURE: 60 MMHG | BODY MASS INDEX: 31.53 KG/M2 | WEIGHT: 178 LBS | SYSTOLIC BLOOD PRESSURE: 112 MMHG

## 2020-05-13 DIAGNOSIS — R76.8 BIOLOGICAL FALSE POSITIVE RPR TEST: ICD-10-CM

## 2020-05-13 DIAGNOSIS — O09.299 HISTORY OF SHOULDER DYSTOCIA IN PRIOR PREGNANCY, CURRENTLY PREGNANT: ICD-10-CM

## 2020-05-13 DIAGNOSIS — Z36.89 ENCOUNTER FOR ULTRASOUND TO ASSESS INTERVAL GROWTH OF FETUS: Primary | ICD-10-CM

## 2020-05-13 DIAGNOSIS — Z3A.30 30 WEEKS GESTATION OF PREGNANCY: Primary | ICD-10-CM

## 2020-05-13 DIAGNOSIS — O99.810 ABNORMAL GLUCOSE TOLERANCE TEST (GTT) DURING PREGNANCY, ANTEPARTUM: ICD-10-CM

## 2020-05-13 DIAGNOSIS — Z13.89 SCREENING FOR BLOOD OR PROTEIN IN URINE: ICD-10-CM

## 2020-05-13 DIAGNOSIS — Z34.80 SUPERVISION OF OTHER NORMAL PREGNANCY, ANTEPARTUM: ICD-10-CM

## 2020-05-13 LAB
BILIRUB BLD-MCNC: NEGATIVE MG/DL
CLARITY, POC: CLEAR
COLOR UR: YELLOW
GLUCOSE UR STRIP-MCNC: NEGATIVE MG/DL
KETONES UR QL: NEGATIVE
LEUKOCYTE EST, POC: ABNORMAL
NITRITE UR-MCNC: NEGATIVE MG/ML
PH UR: 6 [PH] (ref 5–8)
PROT UR STRIP-MCNC: ABNORMAL MG/DL
RBC # UR STRIP: NEGATIVE /UL
SP GR UR: 1.03 (ref 1–1.03)
UROBILINOGEN UR QL: NORMAL

## 2020-05-13 PROCEDURE — 76816 OB US FOLLOW-UP PER FETUS: CPT | Performed by: OBSTETRICS & GYNECOLOGY

## 2020-05-13 PROCEDURE — 99213 OFFICE O/P EST LOW 20 MIN: CPT | Performed by: OBSTETRICS & GYNECOLOGY

## 2020-05-13 NOTE — PROGRESS NOTES
Chief Complaint   Patient presents with   • Routine Prenatal Visit     US today       HPI: 30 y.o.  at 30w3d presents for routine OB check with out major complaints asking when her scheduled section will be performed.  Interval growth is done on the fetus showing normal growth with estimated fetal weight at the 39th percentile and abdominal circumference 31st percentile.        Vitals:    20 1525   BP: 112/60   Weight: 80.7 kg (178 lb)       Review of systems:     Gen: negative  CV:     negative  GI: negative  :   negative and good fetal movement noted   MS:    negative  Neuro: negative  Pul: negative      A/P  1. Intrauterine pregnancy at 30w3d   2. Pregnancy Risk:  NORMAL    Berry was seen today for routine prenatal visit.    Diagnoses and all orders for this visit:    30 weeks gestation of pregnancy    Screening for blood or protein in urine  -     POC Urinalysis Dipstick    Supervision of other normal pregnancy, antepartum    Abnormal glucose tolerance test (GTT) during pregnancy, antepartum    Biological false positive RPR test    Delivery by planned , 37-39 wks due to labor, with postp compl  -     Case Request    History of shoulder dystocia in prior pregnancy, currently pregnant  -     Case Request           labor was discussed.  Warnings were provided.      -----------------------  PLAN:   Return in about 2 weeks (around 2020) for ob check.      Oliverio Estrada MD  2020 15:46

## 2020-05-27 ENCOUNTER — ROUTINE PRENATAL (OUTPATIENT)
Dept: OBSTETRICS AND GYNECOLOGY | Age: 30
End: 2020-05-27

## 2020-05-27 VITALS — DIASTOLIC BLOOD PRESSURE: 70 MMHG | WEIGHT: 173 LBS | BODY MASS INDEX: 30.65 KG/M2 | SYSTOLIC BLOOD PRESSURE: 110 MMHG

## 2020-05-27 DIAGNOSIS — Z34.80 SUPERVISION OF OTHER NORMAL PREGNANCY, ANTEPARTUM: ICD-10-CM

## 2020-05-27 DIAGNOSIS — A53.0 POSITIVE RPR TEST: ICD-10-CM

## 2020-05-27 DIAGNOSIS — O21.9 NAUSEA AND VOMITING DURING PREGNANCY: ICD-10-CM

## 2020-05-27 DIAGNOSIS — O09.299 HISTORY OF SHOULDER DYSTOCIA IN PRIOR PREGNANCY, CURRENTLY PREGNANT: ICD-10-CM

## 2020-05-27 DIAGNOSIS — Z3A.32 32 WEEKS GESTATION OF PREGNANCY: Primary | ICD-10-CM

## 2020-05-27 DIAGNOSIS — Z13.89 SCREENING FOR BLOOD OR PROTEIN IN URINE: ICD-10-CM

## 2020-05-27 LAB
BILIRUB BLD-MCNC: ABNORMAL MG/DL
GLUCOSE UR STRIP-MCNC: NEGATIVE MG/DL
KETONES UR QL: ABNORMAL
LEUKOCYTE EST, POC: ABNORMAL
NITRITE UR-MCNC: NEGATIVE MG/ML
PH UR: 6.5 [PH] (ref 5–8)
PROT UR STRIP-MCNC: ABNORMAL MG/DL
RBC # UR STRIP: ABNORMAL /UL
SP GR UR: 1.03 (ref 1–1.03)
UROBILINOGEN UR QL: NORMAL

## 2020-05-27 PROCEDURE — 99213 OFFICE O/P EST LOW 20 MIN: CPT | Performed by: OBSTETRICS & GYNECOLOGY

## 2020-05-27 NOTE — PROGRESS NOTES
Chief Complaint   Patient presents with   • Routine Prenatal Visit     cc: pt been having nausea for the past couple of weeks zofran helps patient states she did not have a great appetite past 2 weeks  , yesterday she  noticed some yellowish discharge denies any fluid loss, cramping  or itching ,no  bleeding , good fetal movement .        HPI: 30 y.o.  at 32w3d presents for routine OB check with intermittent moderate nausea in the past week.  The patient says she had similar symptoms with persistent nausea throughout the pregnancy.  The patient notably has a 5 pound weight loss since last visit.    Vitals:    20 1046   BP: 110/70   Weight: 78.5 kg (173 lb)       Review of systems:     Gen: negative  CV:     negative  GI: nausea and vomiting   :   vaginal discharge  MS:    negative  Neuro: negative and denies headaches and visual changes   Pul: negative      A/P  1. Intrauterine pregnancy at 32w3d   2. Pregnancy Risk:  NORMAL    Berry was seen today for routine prenatal visit.    Diagnoses and all orders for this visit:    32 weeks gestation of pregnancy    Screening for blood or protein in urine  -     POC Urinalysis Dipstick    Supervision of other normal pregnancy, antepartum    Delivery by planned , 37-39 wks due to labor, with postp compl    History of shoulder dystocia in prior pregnancy, currently pregnant    Positive RPR test    Nausea and vomiting during pregnancy          Detailed instructions for  were provided      -----------------------  PLAN:   Return in about 2 weeks (around 6/10/2020) for ob check.      Oliverio Estrada MD  2020 13:12

## 2020-06-10 ENCOUNTER — ROUTINE PRENATAL (OUTPATIENT)
Dept: OBSTETRICS AND GYNECOLOGY | Age: 30
End: 2020-06-10

## 2020-06-10 VITALS — BODY MASS INDEX: 30.82 KG/M2 | WEIGHT: 174 LBS | SYSTOLIC BLOOD PRESSURE: 110 MMHG | DIASTOLIC BLOOD PRESSURE: 70 MMHG

## 2020-06-10 DIAGNOSIS — O47.9 BRAXTON HICK'S CONTRACTION: ICD-10-CM

## 2020-06-10 DIAGNOSIS — O09.299 HISTORY OF SHOULDER DYSTOCIA IN PRIOR PREGNANCY, CURRENTLY PREGNANT: ICD-10-CM

## 2020-06-10 DIAGNOSIS — Z3A.34 34 WEEKS GESTATION OF PREGNANCY: Primary | ICD-10-CM

## 2020-06-10 DIAGNOSIS — Z13.89 SCREENING FOR BLOOD OR PROTEIN IN URINE: ICD-10-CM

## 2020-06-10 DIAGNOSIS — O99.810 ABNORMAL GLUCOSE TOLERANCE TEST (GTT) DURING PREGNANCY, ANTEPARTUM: ICD-10-CM

## 2020-06-10 DIAGNOSIS — Z34.80 SUPERVISION OF OTHER NORMAL PREGNANCY, ANTEPARTUM: ICD-10-CM

## 2020-06-10 DIAGNOSIS — A53.0 POSITIVE RPR TEST: ICD-10-CM

## 2020-06-10 DIAGNOSIS — R76.8 BIOLOGICAL FALSE POSITIVE RPR TEST: ICD-10-CM

## 2020-06-10 LAB
BILIRUB BLD-MCNC: ABNORMAL MG/DL
CLARITY, POC: CLEAR
COLOR UR: YELLOW
GLUCOSE UR STRIP-MCNC: NEGATIVE MG/DL
KETONES UR QL: ABNORMAL
LEUKOCYTE EST, POC: ABNORMAL
NITRITE UR-MCNC: NEGATIVE MG/ML
PH UR: 6.5 [PH] (ref 5–8)
PROT UR STRIP-MCNC: ABNORMAL MG/DL
RBC # UR STRIP: NEGATIVE /UL
SP GR UR: 1.02 (ref 1–1.03)
UROBILINOGEN UR QL: NORMAL

## 2020-06-10 PROCEDURE — 99213 OFFICE O/P EST LOW 20 MIN: CPT | Performed by: OBSTETRICS & GYNECOLOGY

## 2020-06-10 NOTE — PROGRESS NOTES
Chief Complaint   Patient presents with   • Routine Prenatal Visit     no complaints       HPI: 30 y.o.  at 34w3d presents for routine OB check with complaint of intermittent moderate cramps over the past week.  Episodes seem fairly limited and she would equate the contractions to [place] William    Vitals:    06/10/20 0807   BP: 110/70   Weight: 78.9 kg (174 lb)       Review of systems:     Gen: negative  CV:     negative  GI: negative  :   jennifer william type contractions, pelvic pressure and pelvic cramping  MS:    negative  Neuro: negative and denies headaches and visual changes   Pul: negative      A/P  1. Intrauterine pregnancy at 34w3d   2. Pregnancy Risk:  NORMAL    Berry was seen today for routine prenatal visit.    Diagnoses and all orders for this visit:    34 weeks gestation of pregnancy    Screening for blood or protein in urine  -     POC Urinalysis Dipstick    Supervision of other normal pregnancy, antepartum    Delivery by planned , 37-39 wks due to labor, with postp compl    History of shoulder dystocia in prior pregnancy, currently pregnant    Positive RPR test    Biological false positive RPR test    Abnormal glucose tolerance test (GTT) during pregnancy, antepartum    [place] Hick's contraction          Routine labor warnings were discussed and indications for Labor & Delivery follow-up      -----------------------  PLAN:   Return in about 1 week (around 2020) for ob check.      Oliverio Estrada MD  6/10/2020 08:19

## 2020-06-17 ENCOUNTER — ROUTINE PRENATAL (OUTPATIENT)
Dept: OBSTETRICS AND GYNECOLOGY | Age: 30
End: 2020-06-17

## 2020-06-17 VITALS — DIASTOLIC BLOOD PRESSURE: 72 MMHG | BODY MASS INDEX: 31 KG/M2 | SYSTOLIC BLOOD PRESSURE: 112 MMHG | WEIGHT: 175 LBS

## 2020-06-17 DIAGNOSIS — Z36.85 ANTENATAL SCREENING FOR STREPTOCOCCUS B: ICD-10-CM

## 2020-06-17 DIAGNOSIS — Z34.80 SUPERVISION OF OTHER NORMAL PREGNANCY, ANTEPARTUM: Primary | ICD-10-CM

## 2020-06-17 DIAGNOSIS — Z13.89 SCREENING FOR BLOOD OR PROTEIN IN URINE: ICD-10-CM

## 2020-06-17 PROBLEM — R12 HEARTBURN DURING PREGNANCY: Status: ACTIVE | Noted: 2020-06-17

## 2020-06-17 PROBLEM — O26.899 HEARTBURN DURING PREGNANCY: Status: ACTIVE | Noted: 2020-06-17

## 2020-06-17 LAB
BILIRUB BLD-MCNC: NEGATIVE MG/DL
GLUCOSE UR STRIP-MCNC: NEGATIVE MG/DL
KETONES UR QL: NEGATIVE
LEUKOCYTE EST, POC: ABNORMAL
NITRITE UR-MCNC: NEGATIVE MG/ML
PH UR: 6.5 [PH] (ref 5–8)
PROT UR STRIP-MCNC: NEGATIVE MG/DL
RBC # UR STRIP: ABNORMAL /UL
SP GR UR: 1.02 (ref 1–1.03)
UROBILINOGEN UR QL: NORMAL

## 2020-06-17 PROCEDURE — 99213 OFFICE O/P EST LOW 20 MIN: CPT | Performed by: NURSE PRACTITIONER

## 2020-06-17 RX ORDER — FAMOTIDINE 20 MG/1
20 TABLET, FILM COATED ORAL 2 TIMES DAILY PRN
Qty: 30 TABLET | Refills: 0 | Status: SHIPPED | OUTPATIENT
Start: 2020-06-17 | End: 2020-07-01

## 2020-06-17 NOTE — PROGRESS NOTES
Chief Complaint   Patient presents with   • Routine Prenatal Visit     cc: jennifer elder , pain right hip , good fetal movement , pt due for gbs swab today        HPI: 30 y.o.  at 35w3d presents for routine OB visit.    Reports good fetal movement  Denies ctx, lof, bleeding  She complains today of back/hip pain and heartburn. Heartburn is not relieved by tums- requesting rx. pepcid sent to pharmacy. Preventative measures discussed.     Vitals:    20 0843   BP: 112/72   Weight: 79.4 kg (175 lb)       Review of systems:     Gen: negative  CV:     negative  GI: gastroesophageal reflux symptoms   :   good fetal movement noted  and jennifer elder type contractions  MS:    back pain  and hip pain  Neuro: negative  Pul: negative      A/P  1. Intrauterine pregnancy at 35w3d   2. Pregnancy Risk:  NORMAL    Berry was seen today for routine prenatal visit.    Diagnoses and all orders for this visit:    Supervision of other normal pregnancy, antepartum    Screening for blood or protein in urine  -     POC Urinalysis Dipstick     screening for streptococcus B  -     Group B Streptococcus Culture - Swab, Vaginal/Rectum           labor was discussed.  Warnings were provided.  PTL warnings   Heartburn- pepcid rx  Back pain in pregnancy- comfort measures discussed   -----------------------  PLAN:     1 week OB visit  Call for any changes or concerns       Corina Jackson, MARILEE  2020 09:04

## 2020-06-21 LAB — B-HEM STREP SPEC QL CULT: NEGATIVE

## 2020-06-22 ENCOUNTER — TELEPHONE (OUTPATIENT)
Dept: OBSTETRICS AND GYNECOLOGY | Age: 30
End: 2020-06-22

## 2020-06-22 NOTE — TELEPHONE ENCOUNTER
----- Message from MARILEE Louie sent at 6/22/2020  9:15 AM EDT -----  Please notify patient her GBS swab returned negative.

## 2020-06-25 ENCOUNTER — ROUTINE PRENATAL (OUTPATIENT)
Dept: OBSTETRICS AND GYNECOLOGY | Age: 30
End: 2020-06-25

## 2020-06-25 VITALS — WEIGHT: 178 LBS | DIASTOLIC BLOOD PRESSURE: 64 MMHG | BODY MASS INDEX: 31.53 KG/M2 | SYSTOLIC BLOOD PRESSURE: 104 MMHG

## 2020-06-25 DIAGNOSIS — Z34.80 SUPERVISION OF OTHER NORMAL PREGNANCY, ANTEPARTUM: ICD-10-CM

## 2020-06-25 DIAGNOSIS — O99.810 ABNORMAL GLUCOSE TOLERANCE TEST (GTT) DURING PREGNANCY, ANTEPARTUM: ICD-10-CM

## 2020-06-25 DIAGNOSIS — Z3A.36 36 WEEKS GESTATION OF PREGNANCY: Primary | ICD-10-CM

## 2020-06-25 DIAGNOSIS — Z13.89 SCREENING FOR BLOOD OR PROTEIN IN URINE: ICD-10-CM

## 2020-06-25 DIAGNOSIS — O09.299 HISTORY OF SHOULDER DYSTOCIA IN PRIOR PREGNANCY, CURRENTLY PREGNANT: ICD-10-CM

## 2020-06-25 DIAGNOSIS — A53.0 POSITIVE RPR TEST: ICD-10-CM

## 2020-06-25 LAB
BILIRUB BLD-MCNC: NEGATIVE MG/DL
CLARITY, POC: CLEAR
COLOR UR: YELLOW
GLUCOSE UR STRIP-MCNC: NEGATIVE MG/DL
KETONES UR QL: ABNORMAL
LEUKOCYTE EST, POC: ABNORMAL
NITRITE UR-MCNC: NEGATIVE MG/ML
PH UR: 7.5 [PH] (ref 5–8)
PROT UR STRIP-MCNC: ABNORMAL MG/DL
RBC # UR STRIP: NEGATIVE /UL
SP GR UR: 1.02 (ref 1–1.03)
UROBILINOGEN UR QL: NORMAL

## 2020-06-25 PROCEDURE — 99213 OFFICE O/P EST LOW 20 MIN: CPT | Performed by: OBSTETRICS & GYNECOLOGY

## 2020-06-25 NOTE — PROGRESS NOTES
Chief Complaint   Patient presents with   • Routine Prenatal Visit     Pt c/o constipation.        HPI: 30 y.o.  at 36w4d presents for routine OB check with complaint of constipation with no BM for 3 days.  The patient reports few contractions and active fetus    Vitals:    20 1605   BP: 104/64   Weight: 80.7 kg (178 lb)       Review of systems:     Gen: negative  CV:     negative  GI: constipation  :   negative and good fetal movement noted   MS:    negative  Neuro: negative  Pul: negative      A/P  1. Intrauterine pregnancy at 36w4d   2. Pregnancy Risk:  NORMAL    Berry was seen today for routine prenatal visit.    Diagnoses and all orders for this visit:    36 weeks gestation of pregnancy    Screening for blood or protein in urine  -     POC Urinalysis Dipstick    Supervision of other normal pregnancy, antepartum    Delivery by planned , 37-39 wks due to labor, with postp compl    History of shoulder dystocia in prior pregnancy, currently pregnant    Positive RPR test    Abnormal glucose tolerance test (GTT) during pregnancy, antepartum          Routine labor warnings were discussed and indications for Labor & Delivery follow-up      -----------------------  PLAN:   Return in about 1 week (around 2020) for ob check.      Oliverio Estrada MD  2020 16:18

## 2020-07-01 ENCOUNTER — ROUTINE PRENATAL (OUTPATIENT)
Dept: OBSTETRICS AND GYNECOLOGY | Age: 30
End: 2020-07-01

## 2020-07-01 VITALS — DIASTOLIC BLOOD PRESSURE: 70 MMHG | SYSTOLIC BLOOD PRESSURE: 120 MMHG | BODY MASS INDEX: 31.18 KG/M2 | WEIGHT: 176 LBS

## 2020-07-01 DIAGNOSIS — O26.893 HEARTBURN DURING PREGNANCY IN THIRD TRIMESTER: ICD-10-CM

## 2020-07-01 DIAGNOSIS — O09.299 CURRENT SINGLETON PREGNANCY WITH HISTORY OF CONGENITAL ANOMALY IN PRIOR CHILD, ANTEPARTUM: ICD-10-CM

## 2020-07-01 DIAGNOSIS — O99.810 ABNORMAL GLUCOSE TOLERANCE TEST (GTT) DURING PREGNANCY, ANTEPARTUM: ICD-10-CM

## 2020-07-01 DIAGNOSIS — Z3A.37 37 WEEKS GESTATION OF PREGNANCY: Primary | ICD-10-CM

## 2020-07-01 DIAGNOSIS — Z34.80 SUPERVISION OF OTHER NORMAL PREGNANCY, ANTEPARTUM: ICD-10-CM

## 2020-07-01 DIAGNOSIS — R76.8 BIOLOGICAL FALSE POSITIVE RPR TEST: ICD-10-CM

## 2020-07-01 DIAGNOSIS — R12 HEARTBURN DURING PREGNANCY IN THIRD TRIMESTER: ICD-10-CM

## 2020-07-01 DIAGNOSIS — Z13.89 SCREENING FOR BLOOD OR PROTEIN IN URINE: ICD-10-CM

## 2020-07-01 DIAGNOSIS — O09.299 HISTORY OF SHOULDER DYSTOCIA IN PRIOR PREGNANCY, CURRENTLY PREGNANT: ICD-10-CM

## 2020-07-01 LAB
BILIRUB BLD-MCNC: NEGATIVE MG/DL
CLARITY, POC: CLEAR
COLOR UR: YELLOW
GLUCOSE UR STRIP-MCNC: NEGATIVE MG/DL
KETONES UR QL: NEGATIVE
LEUKOCYTE EST, POC: ABNORMAL
NITRITE UR-MCNC: NEGATIVE MG/ML
PH UR: 7 [PH] (ref 5–8)
PROT UR STRIP-MCNC: NEGATIVE MG/DL
RBC # UR STRIP: NEGATIVE /UL
SP GR UR: 1.02 (ref 1–1.03)
UROBILINOGEN UR QL: NORMAL

## 2020-07-01 PROCEDURE — 99213 OFFICE O/P EST LOW 20 MIN: CPT | Performed by: OBSTETRICS & GYNECOLOGY

## 2020-07-01 RX ORDER — OMEPRAZOLE 40 MG/1
40 CAPSULE, DELAYED RELEASE ORAL DAILY
Qty: 30 CAPSULE | Refills: 5 | Status: SHIPPED | OUTPATIENT
Start: 2020-07-01 | End: 2020-07-14 | Stop reason: HOSPADM

## 2020-07-01 NOTE — PROGRESS NOTES
Chief Complaint   Patient presents with   • Routine Prenatal Visit     37w3d, no cc        HPI: 30 y.o.  at 37w3d presents for routine OB check with complaint of continued intermittent moderate reflux symptoms exacerbated by coffee.  The patient reports she is almost out of her Pepcid.  We discussed using omeprazole in lieu of Pepcid as it may be more effective.  The patient reports good fetal movement in few contractions.  The patient denies any symptoms of preeclampsia    Vitals:    20 0944   BP: 120/70   Weight: 79.8 kg (176 lb)       Review of systems:     Gen: negative  CV:     negative  GI: gastroesophageal reflux symptoms   :   negative and good fetal movement noted   MS:    negative  Neuro: negative and denies headaches and visual changes   Pul: negative      A/P  1. Intrauterine pregnancy at 37w3d   2. Pregnancy Risk:  NORMAL    Berry was seen today for routine prenatal visit.    Diagnoses and all orders for this visit:    37 weeks gestation of pregnancy    Screening for blood or protein in urine  -     POC Urinalysis Dipstick    Supervision of other normal pregnancy, antepartum    Abnormal glucose tolerance test (GTT) during pregnancy, antepartum    Current mcclellan pregnancy with history of congenital anomaly in prior child, antepartum    Delivery by planned , 37-39 wks due to labor, with postp compl    History of shoulder dystocia in prior pregnancy, currently pregnant    Heartburn during pregnancy in third trimester  -     omeprazole (priLOSEC) 40 MG capsule; Take 1 capsule by mouth Daily.    Biological false positive RPR test          Pre-eclampsia symptoms were discussed and warnings were given  Routine labor warnings were discussed and indications for Labor & Delivery follow-up      -----------------------  PLAN:   Return in about 1 week (around 2020) for ob check.      Oliverio Estrada MD  2020 10:01

## 2020-07-09 ENCOUNTER — ROUTINE PRENATAL (OUTPATIENT)
Dept: OBSTETRICS AND GYNECOLOGY | Age: 30
End: 2020-07-09

## 2020-07-09 VITALS — SYSTOLIC BLOOD PRESSURE: 124 MMHG | WEIGHT: 180 LBS | BODY MASS INDEX: 31.89 KG/M2 | DIASTOLIC BLOOD PRESSURE: 80 MMHG

## 2020-07-09 DIAGNOSIS — O99.810 ABNORMAL GLUCOSE TOLERANCE TEST (GTT) DURING PREGNANCY, ANTEPARTUM: ICD-10-CM

## 2020-07-09 DIAGNOSIS — O26.893 HEARTBURN DURING PREGNANCY IN THIRD TRIMESTER: ICD-10-CM

## 2020-07-09 DIAGNOSIS — Z13.89 SCREENING FOR BLOOD OR PROTEIN IN URINE: ICD-10-CM

## 2020-07-09 DIAGNOSIS — R12 HEARTBURN DURING PREGNANCY IN THIRD TRIMESTER: ICD-10-CM

## 2020-07-09 DIAGNOSIS — Z3A.38 38 WEEKS GESTATION OF PREGNANCY: Primary | ICD-10-CM

## 2020-07-09 DIAGNOSIS — Z34.80 SUPERVISION OF OTHER NORMAL PREGNANCY, ANTEPARTUM: ICD-10-CM

## 2020-07-09 DIAGNOSIS — O09.299 HISTORY OF SHOULDER DYSTOCIA IN PRIOR PREGNANCY, CURRENTLY PREGNANT: ICD-10-CM

## 2020-07-09 LAB
BILIRUB BLD-MCNC: NEGATIVE MG/DL
CLARITY, POC: CLEAR
COLOR UR: YELLOW
GLUCOSE UR STRIP-MCNC: NEGATIVE MG/DL
KETONES UR QL: NEGATIVE
LEUKOCYTE EST, POC: ABNORMAL
NITRITE UR-MCNC: NEGATIVE MG/ML
PH UR: 7.5 [PH] (ref 5–8)
PROT UR STRIP-MCNC: NEGATIVE MG/DL
RBC # UR STRIP: NEGATIVE /UL
SP GR UR: 1.02 (ref 1–1.03)
UROBILINOGEN UR QL: NORMAL

## 2020-07-09 PROCEDURE — 99213 OFFICE O/P EST LOW 20 MIN: CPT | Performed by: OBSTETRICS & GYNECOLOGY

## 2020-07-09 NOTE — PROGRESS NOTES
Chief Complaint   Patient presents with   • Routine Prenatal Visit     38w4d, pt c/o pulled muscle that happened this morning in left lower abdomin, she also c/o of pelvic pressure and small contractions        HPI: 30 y.o.  at 38w4d with complaint of intermittent moderate left lower quadrant pain like she pulled a muscle this morning.  She states infrequent mild contractions and the fetus is been moving actively.  The patient scheduled for  section on Monday for history of shoulder dystocia    Vitals:    20 1125   BP: 124/80   Weight: 81.6 kg (180 lb)       Review of systems:     Gen: negative  CV:     negative  GI: negative  :   negative and good fetal movement noted   MS:    back pain   Neuro: negative  Pul: negative      A/P  1. Intrauterine pregnancy at 38w4d   2. Pregnancy Risk:  NORMAL    Berry was seen today for routine prenatal visit.    Diagnoses and all orders for this visit:    38 weeks gestation of pregnancy    Screening for blood or protein in urine  -     POC Urinalysis Dipstick    Supervision of other normal pregnancy, antepartum    Abnormal glucose tolerance test (GTT) during pregnancy, antepartum    Delivery by planned , 37-39 wks due to labor, with postp compl    History of shoulder dystocia in prior pregnancy, currently pregnant    Heartburn during pregnancy in third trimester          Routine labor warnings were discussed and indications for Labor & Delivery follow-up  Detailed instructions for  were provided      -----------------------  PLAN:   No follow-ups on file.      Oliverio Estrada MD  2020 12:11

## 2020-07-12 ENCOUNTER — ANESTHESIA (OUTPATIENT)
Dept: LABOR AND DELIVERY | Facility: HOSPITAL | Age: 30
End: 2020-07-12

## 2020-07-12 ENCOUNTER — ANESTHESIA EVENT (OUTPATIENT)
Dept: LABOR AND DELIVERY | Facility: HOSPITAL | Age: 30
End: 2020-07-12

## 2020-07-12 ENCOUNTER — HOSPITAL ENCOUNTER (INPATIENT)
Facility: HOSPITAL | Age: 30
LOS: 2 days | Discharge: HOME | End: 2020-07-14
Attending: OBSTETRICS & GYNECOLOGY | Admitting: OBSTETRICS & GYNECOLOGY

## 2020-07-12 DIAGNOSIS — Z98.891 S/P CESAREAN SECTION: Primary | ICD-10-CM

## 2020-07-12 PROBLEM — Z34.90 PREGNANCY: Status: ACTIVE | Noted: 2020-07-12

## 2020-07-12 LAB
ABO GROUP BLD: NORMAL
ATMOSPHERIC PRESS: 746.9 MMHG
BACTERIA UR QL AUTO: ABNORMAL /HPF
BASE EXCESS BLDCOA CALC-SCNC: -1.9 MMOL/L
BASOPHILS # BLD AUTO: 0.06 10*3/MM3 (ref 0–0.2)
BASOPHILS NFR BLD AUTO: 0.5 % (ref 0–1.5)
BDY SITE: ABNORMAL
BILIRUB UR QL STRIP: NEGATIVE
BLD GP AB SCN SERPL QL: NEGATIVE
CLARITY UR: CLEAR
COLOR UR: YELLOW
DEPRECATED RDW RBC AUTO: 40.8 FL (ref 37–54)
EOSINOPHIL # BLD AUTO: 0.06 10*3/MM3 (ref 0–0.4)
EOSINOPHIL NFR BLD AUTO: 0.5 % (ref 0.3–6.2)
ERYTHROCYTE [DISTWIDTH] IN BLOOD BY AUTOMATED COUNT: 13 % (ref 12.3–15.4)
GAS FLOW AIRWAY: 2 LPM
GLUCOSE UR STRIP-MCNC: NEGATIVE MG/DL
HCO3 BLDCOA-SCNC: 24.4 MMOL/L (ref 22–28)
HCT VFR BLD AUTO: 28.4 % (ref 34–46.6)
HGB BLD-MCNC: 9.6 G/DL (ref 12–15.9)
HGB UR QL STRIP.AUTO: NEGATIVE
HYALINE CASTS UR QL AUTO: ABNORMAL /LPF
IMM GRANULOCYTES # BLD AUTO: 0.18 10*3/MM3 (ref 0–0.05)
IMM GRANULOCYTES NFR BLD AUTO: 1.6 % (ref 0–0.5)
KETONES UR QL STRIP: NEGATIVE
LEUKOCYTE ESTERASE UR QL STRIP.AUTO: ABNORMAL
LYMPHOCYTES # BLD AUTO: 1.98 10*3/MM3 (ref 0.7–3.1)
LYMPHOCYTES NFR BLD AUTO: 17.7 % (ref 19.6–45.3)
MCH RBC QN AUTO: 29.1 PG (ref 26.6–33)
MCHC RBC AUTO-ENTMCNC: 33.8 G/DL (ref 31.5–35.7)
MCV RBC AUTO: 86.1 FL (ref 79–97)
MODALITY: ABNORMAL
MONOCYTES # BLD AUTO: 0.64 10*3/MM3 (ref 0.1–0.9)
MONOCYTES NFR BLD AUTO: 5.7 % (ref 5–12)
NEUTROPHILS NFR BLD AUTO: 74 % (ref 42.7–76)
NEUTROPHILS NFR BLD AUTO: 8.28 10*3/MM3 (ref 1.7–7)
NITRITE UR QL STRIP: NEGATIVE
NOTE: ABNORMAL
NRBC BLD AUTO-RTO: 0 /100 WBC (ref 0–0.2)
PCO2 BLDCOA: 46.2 MMHG (ref 43–63)
PH BLDCOA: 7.33 PH UNITS (ref 7.18–7.34)
PH UR STRIP.AUTO: 6.5 [PH] (ref 5–8)
PLATELET # BLD AUTO: 212 10*3/MM3 (ref 140–450)
PMV BLD AUTO: 10.9 FL (ref 6–12)
PO2 BLDCOA: 16.6 MMHG (ref 12–26)
PROT UR QL STRIP: NEGATIVE
RBC # BLD AUTO: 3.3 10*6/MM3 (ref 3.77–5.28)
RBC # UR: ABNORMAL /HPF
REF LAB TEST METHOD: ABNORMAL
RH BLD: POSITIVE
SAO2 % BLDCOA: 20.4 % (ref 92–99)
SARS-COV-2 RDRP RESP QL NAA+PROBE: NOT DETECTED
SP GR UR STRIP: 1.01 (ref 1–1.03)
SQUAMOUS #/AREA URNS HPF: ABNORMAL /HPF
T&S EXPIRATION DATE: NORMAL
UROBILINOGEN UR QL STRIP: ABNORMAL
WBC # BLD AUTO: 11.2 10*3/MM3 (ref 3.4–10.8)
WBC UR QL AUTO: ABNORMAL /HPF

## 2020-07-12 PROCEDURE — 82803 BLOOD GASES ANY COMBINATION: CPT

## 2020-07-12 PROCEDURE — 86850 RBC ANTIBODY SCREEN: CPT | Performed by: OBSTETRICS & GYNECOLOGY

## 2020-07-12 PROCEDURE — 88307 TISSUE EXAM BY PATHOLOGIST: CPT

## 2020-07-12 PROCEDURE — 86901 BLOOD TYPING SEROLOGIC RH(D): CPT | Performed by: OBSTETRICS & GYNECOLOGY

## 2020-07-12 PROCEDURE — 99284 EMERGENCY DEPT VISIT MOD MDM: CPT

## 2020-07-12 PROCEDURE — 59515 CESAREAN DELIVERY: CPT | Performed by: OBSTETRICS & GYNECOLOGY

## 2020-07-12 PROCEDURE — 87635 SARS-COV-2 COVID-19 AMP PRB: CPT | Performed by: OBSTETRICS & GYNECOLOGY

## 2020-07-12 PROCEDURE — 25010000002 ONDANSETRON PER 1 MG: Performed by: ANESTHESIOLOGY

## 2020-07-12 PROCEDURE — 25010000002 MORPHINE PER 10 MG: Performed by: ANESTHESIOLOGY

## 2020-07-12 PROCEDURE — 86900 BLOOD TYPING SEROLOGIC ABO: CPT | Performed by: OBSTETRICS & GYNECOLOGY

## 2020-07-12 PROCEDURE — 25010000002 PROPOFOL 10 MG/ML EMULSION: Performed by: NURSE ANESTHETIST, CERTIFIED REGISTERED

## 2020-07-12 PROCEDURE — 99201: CPT

## 2020-07-12 PROCEDURE — 25010000002 PHENYLEPHRINE PER 1 ML: Performed by: NURSE ANESTHETIST, CERTIFIED REGISTERED

## 2020-07-12 PROCEDURE — 63710000001 DIPHENHYDRAMINE PER 50 MG: Performed by: NURSE ANESTHETIST, CERTIFIED REGISTERED

## 2020-07-12 PROCEDURE — 85025 COMPLETE CBC W/AUTO DIFF WBC: CPT | Performed by: OBSTETRICS & GYNECOLOGY

## 2020-07-12 PROCEDURE — C9803 HOPD COVID-19 SPEC COLLECT: HCPCS | Performed by: OBSTETRICS & GYNECOLOGY

## 2020-07-12 PROCEDURE — 25010000002 ONDANSETRON PER 1 MG: Performed by: NURSE ANESTHETIST, CERTIFIED REGISTERED

## 2020-07-12 PROCEDURE — 25010000003 CEFAZOLIN IN DEXTROSE 2-4 GM/100ML-% SOLUTION: Performed by: OBSTETRICS & GYNECOLOGY

## 2020-07-12 PROCEDURE — 87086 URINE CULTURE/COLONY COUNT: CPT | Performed by: OBSTETRICS & GYNECOLOGY

## 2020-07-12 PROCEDURE — 81001 URINALYSIS AUTO W/SCOPE: CPT | Performed by: OBSTETRICS & GYNECOLOGY

## 2020-07-12 PROCEDURE — 25010000002 HYDROMORPHONE PER 4 MG: Performed by: NURSE ANESTHETIST, CERTIFIED REGISTERED

## 2020-07-12 RX ORDER — ONDANSETRON 4 MG/1
4 TABLET, FILM COATED ORAL EVERY 8 HOURS PRN
Status: DISCONTINUED | OUTPATIENT
Start: 2020-07-12 | End: 2020-07-14 | Stop reason: HOSPADM

## 2020-07-12 RX ORDER — DIPHENHYDRAMINE HYDROCHLORIDE 50 MG/ML
25 INJECTION INTRAMUSCULAR; INTRAVENOUS EVERY 4 HOURS PRN
Status: DISCONTINUED | OUTPATIENT
Start: 2020-07-12 | End: 2020-07-14 | Stop reason: HOSPADM

## 2020-07-12 RX ORDER — OXYTOCIN-SODIUM CHLORIDE 0.9% IV SOLN 30 UNIT/500ML 30-0.9/5 UT/ML-%
999 SOLUTION INTRAVENOUS ONCE
Status: DISCONTINUED | OUTPATIENT
Start: 2020-07-12 | End: 2020-07-12 | Stop reason: HOSPADM

## 2020-07-12 RX ORDER — CARBOPROST TROMETHAMINE 250 UG/ML
250 INJECTION, SOLUTION INTRAMUSCULAR AS NEEDED
Status: DISCONTINUED | OUTPATIENT
Start: 2020-07-12 | End: 2020-07-12 | Stop reason: HOSPADM

## 2020-07-12 RX ORDER — MISOPROSTOL 200 UG/1
600 TABLET ORAL ONCE AS NEEDED
Status: DISCONTINUED | OUTPATIENT
Start: 2020-07-12 | End: 2020-07-14 | Stop reason: HOSPADM

## 2020-07-12 RX ORDER — CALCIUM CARBONATE 200(500)MG
2 TABLET,CHEWABLE ORAL EVERY 6 HOURS PRN
Status: DISCONTINUED | OUTPATIENT
Start: 2020-07-12 | End: 2020-07-14 | Stop reason: HOSPADM

## 2020-07-12 RX ORDER — BUPIVACAINE HYDROCHLORIDE 7.5 MG/ML
INJECTION, SOLUTION EPIDURAL; RETROBULBAR
Status: COMPLETED | OUTPATIENT
Start: 2020-07-12 | End: 2020-07-12

## 2020-07-12 RX ORDER — DIPHENHYDRAMINE HCL 25 MG
25 CAPSULE ORAL EVERY 4 HOURS PRN
Status: DISCONTINUED | OUTPATIENT
Start: 2020-07-12 | End: 2020-07-14 | Stop reason: HOSPADM

## 2020-07-12 RX ORDER — SODIUM CHLORIDE 0.9 % (FLUSH) 0.9 %
3 SYRINGE (ML) INJECTION EVERY 12 HOURS SCHEDULED
Status: DISCONTINUED | OUTPATIENT
Start: 2020-07-12 | End: 2020-07-12 | Stop reason: HOSPADM

## 2020-07-12 RX ORDER — OXYCODONE AND ACETAMINOPHEN 7.5; 325 MG/1; MG/1
1 TABLET ORAL EVERY 4 HOURS PRN
Status: DISCONTINUED | OUTPATIENT
Start: 2020-07-12 | End: 2020-07-14 | Stop reason: HOSPADM

## 2020-07-12 RX ORDER — ONDANSETRON 2 MG/ML
INJECTION INTRAMUSCULAR; INTRAVENOUS AS NEEDED
Status: DISCONTINUED | OUTPATIENT
Start: 2020-07-12 | End: 2020-07-12 | Stop reason: SURG

## 2020-07-12 RX ORDER — IBUPROFEN 800 MG/1
800 TABLET ORAL EVERY 8 HOURS PRN
Status: DISCONTINUED | OUTPATIENT
Start: 2020-07-12 | End: 2020-07-14 | Stop reason: HOSPADM

## 2020-07-12 RX ORDER — ONDANSETRON 2 MG/ML
4 INJECTION INTRAMUSCULAR; INTRAVENOUS ONCE AS NEEDED
Status: DISCONTINUED | OUTPATIENT
Start: 2020-07-12 | End: 2020-07-14 | Stop reason: HOSPADM

## 2020-07-12 RX ORDER — SIMETHICONE 80 MG
80 TABLET,CHEWABLE ORAL 4 TIMES DAILY PRN
Status: DISCONTINUED | OUTPATIENT
Start: 2020-07-12 | End: 2020-07-14 | Stop reason: HOSPADM

## 2020-07-12 RX ORDER — CEFAZOLIN SODIUM 2 G/100ML
2 INJECTION, SOLUTION INTRAVENOUS ONCE
Status: COMPLETED | OUTPATIENT
Start: 2020-07-12 | End: 2020-07-12

## 2020-07-12 RX ORDER — ONDANSETRON 2 MG/ML
4 INJECTION INTRAMUSCULAR; INTRAVENOUS ONCE AS NEEDED
Status: COMPLETED | OUTPATIENT
Start: 2020-07-12 | End: 2020-07-12

## 2020-07-12 RX ORDER — OXYTOCIN-SODIUM CHLORIDE 0.9% IV SOLN 30 UNIT/500ML 30-0.9/5 UT/ML-%
250 SOLUTION INTRAVENOUS CONTINUOUS
Status: DISPENSED | OUTPATIENT
Start: 2020-07-12 | End: 2020-07-12

## 2020-07-12 RX ORDER — ONDANSETRON 2 MG/ML
4 INJECTION INTRAMUSCULAR; INTRAVENOUS EVERY 6 HOURS PRN
Status: DISCONTINUED | OUTPATIENT
Start: 2020-07-12 | End: 2020-07-14 | Stop reason: HOSPADM

## 2020-07-12 RX ORDER — OXYCODONE HYDROCHLORIDE AND ACETAMINOPHEN 5; 325 MG/1; MG/1
1 TABLET ORAL EVERY 4 HOURS PRN
Status: DISCONTINUED | OUTPATIENT
Start: 2020-07-12 | End: 2020-07-14 | Stop reason: HOSPADM

## 2020-07-12 RX ORDER — MISOPROSTOL 200 UG/1
800 TABLET ORAL AS NEEDED
Status: DISCONTINUED | OUTPATIENT
Start: 2020-07-12 | End: 2020-07-12 | Stop reason: HOSPADM

## 2020-07-12 RX ORDER — SODIUM CHLORIDE, SODIUM LACTATE, POTASSIUM CHLORIDE, CALCIUM CHLORIDE 600; 310; 30; 20 MG/100ML; MG/100ML; MG/100ML; MG/100ML
125 INJECTION, SOLUTION INTRAVENOUS CONTINUOUS
Status: DISCONTINUED | OUTPATIENT
Start: 2020-07-12 | End: 2020-07-12

## 2020-07-12 RX ORDER — ACETAMINOPHEN 500 MG
1000 TABLET ORAL ONCE
Status: COMPLETED | OUTPATIENT
Start: 2020-07-12 | End: 2020-07-12

## 2020-07-12 RX ORDER — ERYTHROMYCIN 5 MG/G
OINTMENT OPHTHALMIC
Status: DISPENSED
Start: 2020-07-12 | End: 2020-07-13

## 2020-07-12 RX ORDER — LIDOCAINE HYDROCHLORIDE 10 MG/ML
5 INJECTION, SOLUTION EPIDURAL; INFILTRATION; INTRACAUDAL; PERINEURAL AS NEEDED
Status: DISCONTINUED | OUTPATIENT
Start: 2020-07-12 | End: 2020-07-12 | Stop reason: HOSPADM

## 2020-07-12 RX ORDER — EPHEDRINE SULFATE 50 MG/ML
INJECTION, SOLUTION INTRAVENOUS AS NEEDED
Status: DISCONTINUED | OUTPATIENT
Start: 2020-07-12 | End: 2020-07-12 | Stop reason: SURG

## 2020-07-12 RX ORDER — HYDROCORTISONE 25 MG/G
CREAM TOPICAL 3 TIMES DAILY PRN
Status: DISCONTINUED | OUTPATIENT
Start: 2020-07-12 | End: 2020-07-14 | Stop reason: HOSPADM

## 2020-07-12 RX ORDER — OXYTOCIN-SODIUM CHLORIDE 0.9% IV SOLN 30 UNIT/500ML 30-0.9/5 UT/ML-%
125 SOLUTION INTRAVENOUS CONTINUOUS PRN
Status: COMPLETED | OUTPATIENT
Start: 2020-07-12 | End: 2020-07-12

## 2020-07-12 RX ORDER — CARBOPROST TROMETHAMINE 250 UG/ML
250 INJECTION, SOLUTION INTRAMUSCULAR ONCE AS NEEDED
Status: DISCONTINUED | OUTPATIENT
Start: 2020-07-12 | End: 2020-07-14 | Stop reason: HOSPADM

## 2020-07-12 RX ORDER — FAMOTIDINE 10 MG/ML
20 INJECTION, SOLUTION INTRAVENOUS ONCE AS NEEDED
Status: COMPLETED | OUTPATIENT
Start: 2020-07-12 | End: 2020-07-12

## 2020-07-12 RX ORDER — METHYLERGONOVINE MALEATE 0.2 MG/ML
200 INJECTION INTRAVENOUS ONCE AS NEEDED
Status: DISCONTINUED | OUTPATIENT
Start: 2020-07-12 | End: 2020-07-14 | Stop reason: HOSPADM

## 2020-07-12 RX ORDER — SODIUM CHLORIDE 0.9 % (FLUSH) 0.9 %
10 SYRINGE (ML) INJECTION AS NEEDED
Status: DISCONTINUED | OUTPATIENT
Start: 2020-07-12 | End: 2020-07-12 | Stop reason: HOSPADM

## 2020-07-12 RX ORDER — PROPOFOL 10 MG/ML
VIAL (ML) INTRAVENOUS AS NEEDED
Status: DISCONTINUED | OUTPATIENT
Start: 2020-07-12 | End: 2020-07-12 | Stop reason: SURG

## 2020-07-12 RX ORDER — HYDROMORPHONE HYDROCHLORIDE 1 MG/ML
0.5 INJECTION, SOLUTION INTRAMUSCULAR; INTRAVENOUS; SUBCUTANEOUS
Status: DISCONTINUED | OUTPATIENT
Start: 2020-07-12 | End: 2020-07-12 | Stop reason: HOSPADM

## 2020-07-12 RX ORDER — HYDROXYZINE 50 MG/1
50 TABLET, FILM COATED ORAL EVERY 6 HOURS PRN
Status: DISCONTINUED | OUTPATIENT
Start: 2020-07-12 | End: 2020-07-14 | Stop reason: HOSPADM

## 2020-07-12 RX ORDER — MORPHINE SULFATE 1 MG/ML
INJECTION, SOLUTION EPIDURAL; INTRATHECAL; INTRAVENOUS
Status: COMPLETED | OUTPATIENT
Start: 2020-07-12 | End: 2020-07-12

## 2020-07-12 RX ORDER — METHYLERGONOVINE MALEATE 0.2 MG/ML
200 INJECTION INTRAVENOUS ONCE AS NEEDED
Status: DISCONTINUED | OUTPATIENT
Start: 2020-07-12 | End: 2020-07-12 | Stop reason: HOSPADM

## 2020-07-12 RX ORDER — PHYTONADIONE 1 MG/.5ML
INJECTION, EMULSION INTRAMUSCULAR; INTRAVENOUS; SUBCUTANEOUS
Status: DISPENSED
Start: 2020-07-12 | End: 2020-07-13

## 2020-07-12 RX ORDER — MORPHINE SULFATE 2 MG/ML
2 INJECTION, SOLUTION INTRAMUSCULAR; INTRAVENOUS
Status: ACTIVE | OUTPATIENT
Start: 2020-07-12 | End: 2020-07-13

## 2020-07-12 RX ORDER — NALOXONE HCL 0.4 MG/ML
0.2 VIAL (ML) INJECTION
Status: DISCONTINUED | OUTPATIENT
Start: 2020-07-12 | End: 2020-07-14 | Stop reason: HOSPADM

## 2020-07-12 RX ADMIN — OXYTOCIN 125 ML/HR: 10 INJECTION INTRAVENOUS at 17:10

## 2020-07-12 RX ADMIN — SODIUM CHLORIDE, POTASSIUM CHLORIDE, SODIUM LACTATE AND CALCIUM CHLORIDE 1000 ML: 600; 310; 30; 20 INJECTION, SOLUTION INTRAVENOUS at 14:00

## 2020-07-12 RX ADMIN — PHENYLEPHRINE HYDROCHLORIDE 200 MCG: 10 INJECTION INTRAVENOUS at 15:30

## 2020-07-12 RX ADMIN — PHENYLEPHRINE HYDROCHLORIDE 200 MCG: 10 INJECTION INTRAVENOUS at 15:48

## 2020-07-12 RX ADMIN — EPHEDRINE SULFATE 10 MG: 50 INJECTION INTRAVENOUS at 15:14

## 2020-07-12 RX ADMIN — OXYTOCIN 999 ML/HR: 10 INJECTION INTRAVENOUS at 15:30

## 2020-07-12 RX ADMIN — PHENYLEPHRINE HYDROCHLORIDE 200 MCG: 10 INJECTION INTRAVENOUS at 15:38

## 2020-07-12 RX ADMIN — ONDANSETRON HYDROCHLORIDE 4 MG: 2 SOLUTION INTRAMUSCULAR; INTRAVENOUS at 15:36

## 2020-07-12 RX ADMIN — ACETAMINOPHEN 1000 MG: 500 TABLET, FILM COATED ORAL at 14:43

## 2020-07-12 RX ADMIN — HYDROMORPHONE HYDROCHLORIDE 0.5 MG: 1 INJECTION, SOLUTION INTRAMUSCULAR; INTRAVENOUS; SUBCUTANEOUS at 16:55

## 2020-07-12 RX ADMIN — PHENYLEPHRINE HYDROCHLORIDE 200 MCG: 10 INJECTION INTRAVENOUS at 15:24

## 2020-07-12 RX ADMIN — CEFAZOLIN SODIUM 2 G: 2 INJECTION, SOLUTION INTRAVENOUS at 14:43

## 2020-07-12 RX ADMIN — IBUPROFEN 800 MG: 800 TABLET ORAL at 22:39

## 2020-07-12 RX ADMIN — AZITHROMYCIN DIHYDRATE 500 MG: 500 INJECTION, POWDER, LYOPHILIZED, FOR SOLUTION INTRAVENOUS at 15:16

## 2020-07-12 RX ADMIN — PHENYLEPHRINE HYDROCHLORIDE 200 MCG: 10 INJECTION INTRAVENOUS at 15:51

## 2020-07-12 RX ADMIN — PHENYLEPHRINE HYDROCHLORIDE 200 MCG: 10 INJECTION INTRAVENOUS at 15:45

## 2020-07-12 RX ADMIN — PHENYLEPHRINE HYDROCHLORIDE 200 MCG: 10 INJECTION INTRAVENOUS at 15:34

## 2020-07-12 RX ADMIN — BUPIVACAINE HYDROCHLORIDE 1.6 ML: 7.5 INJECTION, SOLUTION EPIDURAL; RETROBULBAR at 15:13

## 2020-07-12 RX ADMIN — EPHEDRINE SULFATE 10 MG: 50 INJECTION INTRAVENOUS at 15:16

## 2020-07-12 RX ADMIN — HYDROMORPHONE HYDROCHLORIDE 0.5 MG: 1 INJECTION, SOLUTION INTRAMUSCULAR; INTRAVENOUS; SUBCUTANEOUS at 17:30

## 2020-07-12 RX ADMIN — ONDANSETRON 4 MG: 2 INJECTION INTRAMUSCULAR; INTRAVENOUS at 14:43

## 2020-07-12 RX ADMIN — FAMOTIDINE 20 MG: 10 INJECTION, SOLUTION INTRAVENOUS at 14:43

## 2020-07-12 RX ADMIN — PHENYLEPHRINE HYDROCHLORIDE 200 MCG: 10 INJECTION INTRAVENOUS at 15:27

## 2020-07-12 RX ADMIN — MORPHINE SULFATE 200 MCG: 1 INJECTION, SOLUTION EPIDURAL; INTRATHECAL; INTRAVENOUS at 15:13

## 2020-07-12 RX ADMIN — PHENYLEPHRINE HYDROCHLORIDE 200 MCG: 10 INJECTION INTRAVENOUS at 15:42

## 2020-07-12 RX ADMIN — PHENYLEPHRINE HYDROCHLORIDE 200 MCG: 10 INJECTION INTRAVENOUS at 15:55

## 2020-07-12 RX ADMIN — PROPOFOL 20 MG: 10 INJECTION, EMULSION INTRAVENOUS at 15:36

## 2020-07-12 RX ADMIN — PHENYLEPHRINE HYDROCHLORIDE 200 MCG: 10 INJECTION INTRAVENOUS at 15:20

## 2020-07-12 RX ADMIN — SODIUM CHLORIDE, POTASSIUM CHLORIDE, SODIUM LACTATE AND CALCIUM CHLORIDE 125 ML/HR: 600; 310; 30; 20 INJECTION, SOLUTION INTRAVENOUS at 14:52

## 2020-07-12 RX ADMIN — DIPHENHYDRAMINE HYDROCHLORIDE 25 MG: 25 CAPSULE ORAL at 18:12

## 2020-07-12 NOTE — LACTATION NOTE
Patient being admitted to 323. Baby is in NBN for respiratory observation. Had nuchal cord and true knot in cord. Patient did not nurse first baby as he would not latch and when she pumped she got blood, ( devante pipe syndrome ?) She palns to nurse this baby and has been leaking colostrum for several months.

## 2020-07-12 NOTE — OP NOTE
2020    Patient:Berry Daniels   MR#:[id number]     Section Procedure Note    Indications: previous shoulder dystocia     Pre-operative Diagnosis: Intrauterine pregnancy at 39w0d    Post-operative Diagnosis: same    Procedure:  Low transverse  section     Surgeon: Oliverio Estrada MD     Assistants: MD Jannet     Anesthesia: Spinal anesthesia    Prenatal care problem list:  Patient Active Problem List   Diagnosis   • Supervision of other normal pregnancy, antepartum   • Current mcclellan pregnancy with history of congenital anomaly in prior child, antepartum   • Positive RPR test   • History of shoulder dystocia in prior pregnancy, currently pregnant   • Subchorionic hemorrhage in first trimester   • Delivery by planned , 37-39 wks due to labor, with postp compl   • Genetic screening - negative prenatal screen    • Spotting affecting pregnancy in second trimester   • Abnormal glucose tolerance test (GTT) during pregnancy, antepartum   • Heartburn during pregnancy   • Pregnancy   • S/P  section       Procedure Details   The patient was seen in the LDR preoperatively. The risks, benefits, complications, treatment options, and expected outcomes were discussed with the patient.  The patient concurred with the proposed plan, giving informed consent.  The site of surgery is discussed. The patient was taken to Operating Room # 01, identified as Berry Daniels and the procedure verified as  Delivery. A Time Out was held and the above information confirmed.    After induction of anesthesia, the patient was draped and prepped in the usual sterile manner. A Pfannenstiel incision was made and carried down through the subcutaneous tissue to the fascia. Fascial incision was made and extended transversely. The fascia was  from the underlying rectus tissue superiorly and inferiorly. The peritoneum was identified and entered. Peritoneal incision was extended longitudinally. The  utero-vesical peritoneal reflection was incised transversely and the bladder flap was bluntly freed from the lower uterine segment. A low transverse uterine incision was made. Delivered from vertex presentation was a female  fetus 3100 g (6 lb 13.4 oz)  with Apgar scores of 8  at one minute and 8  at five minutes. After the umbilical cord was clamped and cut cord blood was obtained for evaluation. The placenta was removed intact and appeared normal. The uterine outline, tubes and ovaries appeared normal.  The uterine incision was closed with running locked sutures of 0 monocryl. Hemostasis was observed. Lavage was carried out until clear.     Peritoneum was closed with 2-0 Vicryl in a running fashion incorporating the muscle in the closure    The fascia was then reapproximated with running sutures of 0 Vicryl. The deep subcutaneous layer was reapproximated with 3-0 Vicryl in a running fashion. The skin was reapproximated with 3-0 monocryl in a subcuticular fashion.     Instrument, sponge, and needle counts were correct prior the abdominal closure and at the conclusion of the case.       Findings:  Live viable female     Estimated Blood Loss:  600 vcc    Calculated Blood Loss:       Total IV Fluids: 1500 ml           Specimens:  Placenta            Complications:  None; patient tolerated the procedure well.           Disposition: PACU - hemodynamically stable.           Condition: stable    Attending Attestation: I was present and scrubbed for the entire procedure.    Cord gases:    pH, Cord Arterial  7.18 - 7.34 pH Units 7.33     pCO2, Cord Arterial  43.0 - 63.0 mmHg 46.2     pO2, Cord Arterial  12.0 - 26.0 mmHg 16.6     HCO3, Cord Arterial  22.0 - 28.0 mmol/L 24.4     Base Exc, Cord Arterial  mmol/L -1.9     Barometric Pressure for Blood Gas  mmHg 746.9     Modality Cannula     Flow Rate  lpm 2     O2 Saturation Calculated  92.0 - 99.0 % 20.4Low           Oliverio Estrada MD  2020   16:20  [id number])

## 2020-07-12 NOTE — ANESTHESIA PREPROCEDURE EVALUATION
Anesthesia Evaluation     no history of anesthetic complications:               Airway   no difficulty expected  Dental - normal exam     Pulmonary - normal exam   (+) a smoker Former,   (-) COPD, asthma, sleep apnea    PE comment: nonlabored  Cardiovascular - negative cardio ROS and normal exam    Rhythm: regular  Rate: normal    (-) hypertension, valvular problems/murmurs, past MI, CAD, dysrhythmias, angina      Neuro/Psych  (+) psychiatric history Anxiety,     (-) seizures, TIA, CVA  GI/Hepatic/Renal/Endo    (+)  GERD,    (-) liver disease, no renal disease, diabetes, no thyroid disorder    Musculoskeletal (-) negative ROS    Abdominal    Substance History      OB/GYN    (+) Pregnant (@39wks),         Other                      Anesthesia Plan    ASA 2     spinal       Anesthetic plan, all risks, benefits, and alternatives have been provided, discussed and informed consent has been obtained with: patient.

## 2020-07-12 NOTE — ANESTHESIA POSTPROCEDURE EVALUATION
"Patient: Berry Daniels    Procedure Summary     Date:  20 Room / Location:   [facility]    Anesthesia Start:  1503 Anesthesia Stop:  1609    Procedure:   SECTION PRIMARY (N/A Abdomen) Diagnosis:       Delivery by planned , 37-39 wks due to labor, with postp compl      History of shoulder dystocia in prior pregnancy, currently pregnant      (Delivery by planned , 37-39 wks due to labor, with postp compl [O75.82])      (History of shoulder dystocia in prior pregnancy, currently pregnant [O09.299])    Surgeon:  Oliverio Estrada MD Provider:  Quinn Grayson MD    Anesthesia Type:  spinal ASA Status:  2          Anesthesia Type: spinal    Vitals  Vitals Value Taken Time   /89 2020  5:12 PM   Temp     Pulse 75 2020  5:21 PM   Resp 16 2020  4:40 PM   SpO2 100 % 2020  5:21 PM   Vitals shown include unvalidated device data.        Post Anesthesia Care and Evaluation    Patient location during evaluation: bedside  Patient participation: complete - patient participated  Level of consciousness: awake  Pain management: adequate  Airway patency: patent  Anesthetic complications: No anesthetic complications    Cardiovascular status: acceptable  Respiratory status: acceptable  Hydration status: acceptable    Comments: */75   Pulse 86   Temp 36.6 °C (97.8 °F) (Oral)   Resp 16   Ht 160 cm (63\")   Wt 82.3 kg (181 lb 6.4 oz)   LMP 10/13/2019   SpO2 99%   Breastfeeding Yes   BMI 32.13 kg/m²         "

## 2020-07-12 NOTE — ANESTHESIA PROCEDURE NOTES
Spinal Block      Patient location during procedure: OB  Start Time: 7/12/2020 3:05 PM  Stop Time: 7/12/2020 3:13 PM  Indication:at surgeon's request, post-op pain management and procedure for pain  Performed By  Anesthesiologist: Quinn Grayson MD  CRNA: Sabra Monteiro CRNA  Preanesthetic Checklist  Completed: patient identified, site marked, surgical consent, pre-op evaluation, timeout performed, IV checked, risks and benefits discussed and monitors and equipment checked  Spinal Block Prep:  Patient Position:sitting  Sterile Tech:cap, gloves, mask and sterile barriers  Prep:Chloraprep  Patient Monitoring:blood pressure monitoring and continuous pulse oximetry  Spinal Block Procedure  Approach:midline  Guidance:landmark technique and palpation technique  Location:L3-L4  Needle Type:Pencan  Needle Gauge:24 G  Placement of Spinal needle event:cerebrospinal fluid aspirated  Paresthesia: no  Fluid Appearance:clear  Medications: Morphine PF injection, 200 mcg  bupivacaine PF (MARCAINE) 0.75 % injection, 1.6 mL  Med Administered at 7/12/2020 3:13 PM   Post Assessment  Patient Tolerance:patient tolerated the procedure well with no apparent complications  Complications no  Additional Notes

## 2020-07-12 NOTE — OBED NOTES
[facility] Note OB        Patient Name: Berry Daniels  YOB: 1990  MRN: [id number]  Admission Date: 2020 12:36 PM  Date of Service: 2020    Chief Complaint: Abdominal Pain (Pt presents to [facility] with complaints of sharp pain rating 6/10 in left lower abdomen. Pain started around 1000 today after turning quickly. Pt notes +FM, denies vaginal bleeding or LOF.)        Subjective     Berry Daniels is a 30 y.o. female  at 39w0d with Estimated Date of Delivery: 20 who presents with the chief complaint listed above.  She sees Oliverio Estrada MD for her prenatal care. Her pregnancy has been complicated by: Prior shoulder dystocia, with plan for primary  section tomorrow.  The patient is presenting to OB ED secondary to developing a sharp pain in her lower abdomen following turning quickly.  Since that time she has had continued mild lower left quadrant pain which is worse with fetal movement and an occasional mild contraction which she is experiencing.  She is feeling normal fetal movement.  She denies vaginal bleeding or loss of fluid.          Objective   Patient Active Problem List    Diagnosis   • Heartburn during pregnancy [O26.899, R12]   • Abnormal glucose tolerance test (GTT) during pregnancy, antepartum [O99.810]   • Spotting affecting pregnancy in second trimester [O26.852]   • Genetic screening - negative prenatal screen  [Z13.79]   • History of shoulder dystocia in prior pregnancy, currently pregnant [O09.299]   • Subchorionic hemorrhage in first trimester [O41.8X10, O46.8X1]   • Delivery by planned , 37-39 wks due to labor, with postp compl [O75.82]   • Positive RPR test [A53.0]   • Supervision of other normal pregnancy, antepartum [Z34.80]   • Current mcclellan pregnancy with history of congenital anomaly in prior child, antepartum [O09.299]        OB History    Para Term  AB Living   3 1 1 0 1 1   SAB TAB Ectopic Molar Multiple Live Births   1 0 0 0  0 1      # Outcome Date GA Lbr [place]/2nd Weight Sex Delivery Anes PTL Lv   3 Current            2 SAB 2015           1 Term 13   4167 g (9 lb 3 oz) M Vag-Spont EPI  [place]      Complications: Shoulder Dystocia      Obstetric Comments   2020 Del note requested    2020 18w4d normal Incompleted anatomic survey, female fetus, CL=5.6, low lying plac  hb    3/18/2020 22w3d normal interval growth: EFW 39% AC 57%, normal and completed anatomic survey, cervical length 3.6 cm with possible funnel, resolved low-lying placenta hb    2020 30w3d normal interval growth: EFW 39% AC 31% hb         Past Medical History:   Diagnosis Date   • Anxiety     no medication   • GERD (gastroesophageal reflux disease)    • History of abnormal cervical Pap smear    • SAB (spontaneous ) 2015   • Vaginal delivery 2013       Past Surgical History:   Procedure Laterality Date   • NO PAST SURGERIES         No current facility-administered medications on file prior to encounter.      Current Outpatient Medications on File Prior to Encounter   Medication Sig Dispense Refill   • ondansetron (ZOFRAN) 8 MG tablet Take 1 tablet by mouth Every 8 (Eight) Hours As Needed for Nausea or Vomiting. 15 tablet 1   • Prenatal Vit-Fe Fumarate-FA (PRENATAL, CLASSIC, VITAMIN) 28-0.8 MG tablet tablet Take  by mouth Daily.         No Known Allergies    Family History   Problem Relation Age of Onset   • Mitral valve prolapse Mother    • Breast cancer Maternal Grandmother    • Fibroids Maternal Grandmother    • Breast cancer Maternal Aunt    • Fibroids Maternal Aunt        Social History     Socioeconomic History   • Marital status: Single     Spouse name: Not on file   • Number of children: 1   • Years of education: Not on file   • Highest education level: Not on file   Tobacco Use   • Smoking status: Former Smoker     Types: Electronic Cigarette   • Smokeless tobacco: Never Used   • Tobacco comment: 1 year ago quit cigs   Substance  "and Sexual Activity   • Alcohol use: No     Frequency: Never   • Drug use: Never   • Sexual activity: Yes     Partners: Male     Birth control/protection: None           Review of Systems   Constitutional: Negative for chills and fever.   HENT: Negative.    Eyes: Negative for photophobia and visual disturbance.   Respiratory: Negative for cough, chest tightness and shortness of breath.    Cardiovascular: Negative for leg swelling.   Gastrointestinal: Positive for abdominal pain ( Irregular abdominal cramping, pain in the left lower quadrant since turning abruptly earlier today.). Negative for diarrhea, nausea and vomiting.   Genitourinary: Negative for dysuria, flank pain, hematuria, pelvic pain, vaginal bleeding and vaginal discharge.   Musculoskeletal: Negative for back pain.   Neurological: Negative for dizziness, weakness and headaches.          PHYSICAL EXAM:      VITAL SIGNS:  Vitals:    07/12/20 1251 07/12/20 1259   BP: 127/83    BP Location: Right arm    Patient Position: Sitting    Pulse: 87    Resp: 18    Temp: 97.8 °F (36.6 °C)    TempSrc: Oral    Height:  160 cm (63\")        FHT'S:                   Baseline:  130 BPM  Variability:  Moderate = 6 - 25 BPM  Accelerations:  15 x 15 accelerations present     Decelerations:  absent  Contractions:   present     Interpretation:   Reactive NST, CAT 1 tracing        PHYSICAL EXAM:    General: well developed; well nourished  no acute distress   Heart: Not performed.   Lungs  : breathing is unlabored     Abdomen: soft, non-tender; no masses  no umbilical or inguinal hernias are present  no hepato-splenomegaly       Cervix: Exam by nurse  Cervical Dilation (cm): 1  Cervical Effacement: 50%  Fetal Station: -2  Cervical Consistency: soft  Cervical Position: mid-position   Contractions: irregular        Extremities: peripheral pulses normal, no pedal edema, no clubbing or cyanosis      LABS AND TESTING ORDERED:  1. Uterine and fetal monitoring  2. Urinalysis      LAB " RESULTS:    Recent Results (from the past 24 hour(s))   Urinalysis With Culture If Indicated - Urine, Clean Catch    Collection Time: 20  1:06 PM   Result Value Ref Range    Color, UA Yellow Yellow, Straw    Appearance, UA Clear Clear    pH, UA 6.5 5.0 - 8.0    Specific Gravity, UA 1.010 1.005 - 1.030    Glucose, UA Negative Negative    Ketones, UA Negative Negative    Bilirubin, UA Negative Negative    Blood, UA Negative Negative    Protein, UA Negative Negative    Leuk Esterase, UA Small (1+) (A) Negative    Nitrite, UA Negative Negative    Urobilinogen, UA 0.2 E.U./dL 0.2 - 1.0 E.U./dL   Urinalysis, Microscopic Only - Urine, Clean Catch    Collection Time: 20  1:06 PM   Result Value Ref Range    RBC, UA 3-5 (A) None Seen, 0-2 /HPF    WBC, UA 6-12 (A) None Seen, 0-2 /HPF    Bacteria, UA 1+ (A) None Seen /HPF    Squamous Epithelial Cells, UA 7-12 (A) None Seen, 0-2 /HPF    Hyaline Casts, UA 0-2 None Seen /LPF    Methodology Automated Microscopy        Lab Results   Component Value Date    ABO O 2019    RH Positive 2019       Lab Results   Component Value Date    STREPGPB Negative 2020                 Assessment/Plan      ASSESSMENT/PLAN:  Berry Daniels is a 30 y.o. female  at 39w0d who presented with abdominal pain possible uterine contractions and new onset left lower quadrant pain following turning abruptly.   She was observed for labor and her cervix was noted to be Cervical Dilation (cm): 1 cm/ Cervical Effacement: 50% % effaced/ vertex at Fetal Station: -2 station on last exam.  She was noted to have a Reactive NST.  CAT 1 tracing.  In view of contractions with plan for C section tomorrow, she will be admitted for delivery by Dr Oliverio Estrada MD , who will assume care and place orders at this time.      Final Impression:  • Pregnancy at 39w0d  • Prior shoulder dystocia with plan for primary C section, now at 39 w 0 days and rosa. Plan to proceed with C  section  • Reactive NST.  CAT 1 tracing  Lab Results   Component Value Date    STREPGPB Negative 06/17/2020   •   Lab Results   Component Value Date    ABO O 12/18/2019    RH Positive 12/18/2019   •   • COVID - 19 status pending  • Dr Oliverio Estrada is assuming care at this time and will place admission orders for planned Csection.        I have spent 25 minutes including face to face time with the patient, greater than 50% in discussion of the diagnosis (counseling) and/or coordination of care.     Mahesh Vasquez MD  7/12/2020  14:10  OB Hospitalist  Phone:  [phone]

## 2020-07-12 NOTE — PLAN OF CARE
Problem: Patient Care Overview  Goal: Plan of Care Review  Outcome: Ongoing (interventions implemented as appropriate)  Flowsheets (Taken 2020 1745)  Progress: improving  Plan of Care Reviewed With: patient; significant other  Goal: Individualization and Mutuality  Outcome: Ongoing (interventions implemented as appropriate)  Goal: Discharge Needs Assessment  Outcome: Ongoing (interventions implemented as appropriate)  Goal: Interprofessional Rounds/Family Conf  Outcome: Ongoing (interventions implemented as appropriate)     Problem: Fall Risk,  (Adult,Obstetrics,Pediatric)  Goal: Identify Related Risk Factors and Signs and Symptoms  Outcome: Ongoing (interventions implemented as appropriate)  Flowsheets (Taken 2020 1745)  Related Risk Factors (Fall Risk, ): regional anesthesia  Signs and Symptoms (Fall Risk, ): presence of fall risk factors  Goal: Absence of Maternal Fall  Outcome: Ongoing (interventions implemented as appropriate)  Flowsheets (Taken 2020 1745)  Absence of Maternal Fall: achieves outcome  Goal: Absence of  Fall/Drop  Outcome: Ongoing (interventions implemented as appropriate)  Flowsheets (Taken 2020 1745)  Absence of  Fall/Drop: achieves outcome     Problem: Postpartum ( Delivery) (Adult,Obstetrics,Pediatric)  Goal: Signs and Symptoms of Listed Potential Problems Will be Absent, Minimized or Managed (Postpartum)  Outcome: Ongoing (interventions implemented as appropriate)  Flowsheets (Taken 2020 1745)  Problems Assessed (Postpartum ): all  Problems Present (Postpartum ): none  Goal: Anesthesia/Sedation Recovery  Outcome: Ongoing (interventions implemented as appropriate)  Flowsheets (Taken 2020 1745)  Anesthesia/Sedation Recovery: criteria met for transfer     Problem: Anesthesia/Analgesia, Neuraxial (Obstetrics)  Goal: Signs and Symptoms of Listed Potential Problems Will be Absent, Minimized or Managed  (Anesthesia/Analgesia, Neuraxial)  Outcome: Ongoing (interventions implemented as appropriate)  Flowsheets (Taken 7/12/2020 5973)  Problems Assessed (Neuraxial Anesthesia/Analgesia, OB): all  Problems Present (Neuraxial Anesth OB): pain/headache

## 2020-07-12 NOTE — H&P
History & Physical    2020    Patient: Berry Daniels          MR#:[id number]    Chief complaint:  contractions    Subjective     30 y.o. female  at 39w0d presents with complaint of intermittent moderate contractions and abdominal pain.  Patient is scheduled for CS tomorrow.    With symptoms will proceed today.  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, antepartum   • Positive RPR test   • History of shoulder dystocia in prior pregnancy, currently pregnant   • Subchorionic hemorrhage in first trimester   • Delivery by planned , 37-39 wks due to labor, with postp compl   • Genetic screening - negative prenatal screen    • Spotting affecting pregnancy in second trimester   • Abnormal glucose tolerance test (GTT) during pregnancy, antepartum   • Heartburn during pregnancy   • Pregnancy       Past Medical History:   Diagnosis Date   • Anxiety     no medication   • GERD (gastroesophageal reflux disease)    • History of abnormal cervical Pap smear    • SAB (spontaneous ) 2015   • Vaginal delivery 2013       Past Surgical History:   Procedure Laterality Date   • NO PAST SURGERIES         Obstetric History:  OB History        3    Para   1    Term   1            AB   1    Living   1       SAB   1    TAB        Ectopic        Molar        Multiple        Live Births   1          Obstetric Comments   2020 Del note requested   2020 18w4d normal Incompleted anatomic survey, female fetus, CL=5.6, low lying plac  hb   3/18/2020 22w3d normal interval growth: EFW 39% AC 57%, normal and completed anatomic survey, cervical length 3.6 cm with possib le funnel, resolved low-lying placenta hb   2020 30w3d normal interval growth: EFW 39% AC 31% hb               Menstrual  History:     Patient's last menstrual period was 10/13/2019.       #: 1, Date: 04/12/13, Sex: Male, Weight: 4167 g (9 lb 3 oz), GA: None, Delivery: Vaginal, Spontaneous, Apgar1: None, Apgar5: None, Living: Living, Birth Comments: None    #: 2, Date: 11/2015, Sex: None, Weight: None, GA: None, Delivery: None, Apgar1: None, Apgar5: None, Living: None, Birth Comments: None    #: 3, Date: None, Sex: None, Weight: None, GA: None, Delivery: None, Apgar1: None, Apgar5: None, Living: None, Birth Comments: None      Prenatal Information:  Prenatal Results     POC Urine Glucose/Protein     Test Value Reference Range Date Time    Urine Glucose Negative mg/dL Negative, 1000 mg/dL (3+) 07/09/20 1132    Urine Protein Negative mg/dL Negative 07/09/20 1132          Initial Prenatal Labs     Test Value Reference Range Date Time    Hemoglobin 13.3 g/dL 11.1 - 15.9 12/18/19 1212    Hematocrit 39.1 % 34.0 - 46.6 12/18/19 1212    Platelets 212 10*3/mm3 140 - 450 07/12/20 1400      279 x10E3/uL 150 - 450 12/18/19 1212    Rubella IgG 10.40 index Immune >0.99 12/18/19 1212    Hepatitis B SAg Negative  Negative 12/18/19 1212    Hepatitis C Ab <0.1 s/co ratio 0.0 - 0.9 12/18/19 1212    RPR Comment  Non-Reactive 04/08/20 0902      Comment  Non-Reactive 01/03/20 1034      Reactive  Non Reactive 12/18/19 1212    ABO O   07/12/20 1400    Rh Positive   07/12/20 1400    Antibody Screen Negative  Negative 12/18/19 1212    HIV Non Reactive  Non Reactive 12/18/19 1212    Urine Culture Final report   02/19/20 1044      Final report   01/22/20 1136      Final report   12/18/19 1606    Gonorrhea Negative  Negative 12/18/19 1440    Chlamydia Negative  Negative 12/18/19 1440    TSH              2nd and 3rd Trimester     Test Value Reference Range Date Time    Hemoglobin (repeated) 9.6 g/dL 12.0 - 15.9 07/12/20 1400      10.8 g/dL 12.0 - 15.9 04/08/20 0840    Hematocrit (repeated) 28.4 % 34.0 - 46.6 07/12/20 1400      31.6 % 34.0 - 46.6 04/08/20 0840      mg/dL 65 - 179 04/08/20 0840    Antibody Screen (repeated) Negative   07/12/20 1400    GTT Fasting 93   04/15/20     GTT 1 Hr 137   04/15/20     GTT 2 Hr 107   04/15/20     GTT 3 Hr 113   04/15/20     Group B Strep Negative  Negative 06/17/20 1145          Drug Screening     Test Value Reference Range Date Time    Amphetamine Screen        Barbiturate Screen        Benzodiazepine Screen        Methadone Screen        Phencyclidine Screen        Opiates Screen        THC Screen        Cocaine Screen        Propoxyphene Screen        Buprenorphine Screen        Methamphetamine Screen        Oxycodone Screen        Tricyclic Antidepressants Screen              Other (Risk screening)     Test Value Reference Range Date Time    Varicella IgG 256 index Immune >165 12/18/19 1212    Parvovirus IgG        CMV IgG        Cystic Fibrosis negative   01/22/20     Hemoglobin electrophoresis NORMAL   12/18/19     NIPT negative   01/22/20     MSAFP-4        AFP (for NTD only) *Screen Negative*   02/19/20 0929              External Prenatal Results     Pregnancy Outside Results - Transcribed From Office Records - See Scanned Records For Details     Test Value Date Time    Hgb 9.6 g/dL 07/12/20 1400      10.8 g/dL 04/08/20 0840      13.3 g/dL 12/18/19 1212    Hct 28.4 % 07/12/20 1400      31.6 % 04/08/20 0840      39.1 % 12/18/19 1212    ABO O  07/12/20 1400    Rh Positive  07/12/20 1400    Antibody Screen Negative  07/12/20 1400      Negative  12/18/19 1212    Glucose Fasting GTT 93  04/15/20     Glucose Tolerance Test 1 hour 137  04/15/20     Glucose Tolerance Test 3 hour 113  04/15/20     Gonorrhea (discrete) Negative  12/18/19 1440    Chlamydia (discrete) Negative  12/18/19 1440    RPR Comment  04/08/20 0902      Comment  01/03/20 1034      Reactive  12/18/19 1212    VDRL       Syphilis Antibody Non Reactive  04/08/20 0902      Non Reactive  01/03/20 1034      Comment  12/27/19 0924    Rubella 10.40 index 12/18/19 1212     HBsAg Negative  12/18/19 1212    Herpes Simplex Virus PCR       Herpes Simplex VIrus Culture       HIV Non Reactive  12/18/19 1212    Hep C RNA Quant PCR       Hep C Antibody <0.1 s/co ratio 12/18/19 1212    AFP 33.1 ng/mL 02/19/20 0929    Group B Strep Negative  06/17/20 1145    GBS Susceptibility to Clindamycin       GBS Susceptibility to Erythromycin       Fetal Fibronectin       Genetic Testing, Maternal Blood             Drug Screening     Test Value Date Time    Urine Drug Screen       Amphetamine Screen       Barbiturate Screen       Benzodiazepine Screen       Methadone Screen       Phencyclidine Screen       Opiates Screen       THC Screen       Cocaine Screen       Propoxyphene Screen       Buprenorphine Screen       Methamphetamine Screen       Oxycodone Screen       Tricyclic Antidepressants Screen                     Family History   Problem Relation Age of Onset   • Mitral valve prolapse Mother    • Breast cancer Maternal Grandmother    • Fibroids Maternal Grandmother    • Breast cancer Maternal Aunt    • Fibroids Maternal Aunt        Social History     Tobacco Use   • Smoking status: Former Smoker     Types: Electronic Cigarette   • Smokeless tobacco: Never Used   • Tobacco comment: 1 year ago quit cigs   Substance Use Topics   • Alcohol use: No     Frequency: Never   • Drug use: Never       Patient has no known allergies.      Current Facility-Administered Medications:   •  azithromycin (ZITHROMAX) 500 mg 0.9% NaCl (Add-vantage) 250 mL, 500 mg, Intravenous, Once, Oliverio Estrada MD  •  carboprost (HEMABATE) injection 250 mcg, 250 mcg, Intramuscular, PRN, Oliverio Estrada MD  •  ceFAZolin in dextrose (ANCEF) IVPB solution 2 g, 2 g, Intravenous, Once, Oliverio Estrada MD, 2 g at 07/12/20 1443  •  erythromycin (ROMYCIN) 5 MG/GM ophthalmic ointment  - ADS Override Pull, , , ,   •  lactated ringers infusion, 125 mL/hr, Intravenous, Continuous, Oliverio Estrada MD, Last Rate: 125 mL/hr at 07/12/20 1452,  125 mL/hr at 07/12/20 1452  •  lidocaine PF 1% (XYLOCAINE) injection 5 mL, 5 mL, Intradermal, PRN, Oliverio Estrada MD  •  methylergonovine (METHERGINE) injection 200 mcg, 200 mcg, Intramuscular, Once PRN, Oliverio Estrada MD  •  miSOPROStol (CYTOTEC) tablet 800 mcg, 800 mcg, Rectal, PRN, Oliverio Estrada MD  •  oxytocin in sodium chloride (PITOCIN) 30 UNIT/500ML infusion solution, 999 mL/hr, Intravenous, Once **FOLLOWED BY** oxytocin in sodium chloride (PITOCIN) 30 UNIT/500ML infusion solution, 250 mL/hr, Intravenous, Continuous **FOLLOWED BY** oxytocin in sodium chloride (PITOCIN) 30 UNIT/500ML infusion solution, 125 mL/hr, Intravenous, Continuous PRN, Oliverio Estrada MD  •  phytonadione (VITAMIN K) 1 MG/0.5ML injection  - ADS Override Pull, , , ,   •  sodium chloride 0.9 % flush 10 mL, 10 mL, Intravenous, PRN, Oliverio Estrada MD  •  sodium chloride 0.9 % flush 3 mL, 3 mL, Intravenous, Q12H, Oliverio Estrada MD    Review of Systems  Review of Systems   Constitutional: Negative.    Respiratory: Negative.    Cardiovascular: Negative.    Gastrointestinal: Positive for abdominal pain.   Genitourinary: Negative.    Neurological: Negative for headaches.   Psychiatric/Behavioral: Negative.        Objective     Vital Signs  Temp:  [97.8 °F (36.6 °C)] 97.8 °F (36.6 °C)  Heart Rate:  [79-87] 84  Resp:  [18] 18  BP: (115-127)/(77-83) 116/77    Physical Exam:  Physical Exam   Constitutional: She is oriented to person, place, and time. She appears well-developed and well-nourished.   HENT:   Head: Normocephalic and atraumatic.   Cardiovascular: Normal rate.   Pulmonary/Chest: Effort normal.   Abdominal: Soft. Bowel sounds are normal. She exhibits no distension. There is no tenderness.   Neurological: She is alert and oriented to person, place, and time.   Skin: Skin is warm and dry.   Psychiatric: She has a normal mood and affect. Her behavior is normal. Judgment and thought content normal.   Nursing note and vitals  reviewed.      Labs:  Lab Results (last 24 hours)     Procedure Component Value Units Date/Time    Urinalysis With Culture If Indicated - Urine, Clean Catch [[id number]]  (Abnormal) Collected:  07/12/20 1306    Specimen:  Urine, Clean Catch Updated:  07/12/20 1326     Color, UA Yellow     Appearance, UA Clear     pH, UA 6.5     Specific Gravity, UA 1.010     Glucose, UA Negative     Ketones, UA Negative     Bilirubin, UA Negative     Blood, UA Negative     Protein, UA Negative     Leuk Esterase, UA Small (1+)     Nitrite, UA Negative     Urobilinogen, UA 0.2 E.U./dL    Urinalysis, Microscopic Only - Urine, Clean Catch [[id number]]  (Abnormal) Collected:  07/12/20 1306    Specimen:  Urine, Clean Catch Updated:  07/12/20 1326     RBC, UA 3-5 /HPF      WBC, UA 6-12 /HPF      Bacteria, UA 1+ /HPF      Squamous Epithelial Cells, UA 7-12 /HPF      Hyaline Casts, UA 0-2 /LPF      Methodology Automated Microscopy    Urine Culture - Urine, Urine, Clean Catch [[id number]] Collected:  07/12/20 1306    Specimen:  Urine, Clean Catch Updated:  07/12/20 1326    CBC & Differential [[id number]] Collected:  07/12/20 1400    Specimen:  Blood Updated:  07/12/20 1409    Narrative:       The following orders were created for panel order CBC & Differential.  Procedure                               Abnormality         Status                     ---------                               -----------         ------                     CBC Auto Differential[[id number]]        Abnormal            Final result                 Please view results for these tests on the individual orders.    CBC Auto Differential [[id number]]  (Abnormal) Collected:  07/12/20 1400    Specimen:  Blood Updated:  07/12/20 1409     WBC 11.20 10*3/mm3      RBC 3.30 10*6/mm3      Hemoglobin 9.6 g/dL      Hematocrit 28.4 %      MCV 86.1 fL      MCH 29.1 pg      MCHC 33.8 g/dL      RDW 13.0 %      RDW-SD 40.8 fl      MPV 10.9 fL      Platelets 212 10*3/mm3      Neutrophil % 74.0 %       Lymphocyte % 17.7 %      Monocyte % 5.7 %      Eosinophil % 0.5 %      Basophil % 0.5 %      Immature Grans % 1.6 %      Neutrophils, Absolute 8.28 10*3/mm3      Lymphocytes, Absolute 1.98 10*3/mm3      Monocytes, Absolute 0.64 10*3/mm3      Eosinophils, Absolute 0.06 10*3/mm3      Basophils, Absolute 0.06 10*3/mm3      Immature Grans, Absolute 0.18 10*3/mm3      nRBC 0.0 /100 WBC     COVID PRE-OP / PRE-PROCEDURE SCREENING ORDER (NO ISOLATION) - Swab, Nasopharynx [[id number]] Collected:  20    Specimen:  Swab from Nasopharynx Updated:  20    Narrative:       The following orders were created for panel order COVID PRE-OP / PRE-PROCEDURE SCREENING ORDER (NO ISOLATION) - Swab, Nasopharynx.  Procedure                               Abnormality         Status                     ---------                               -----------         ------                     COVID-19, [facility] IN-HOUS...[[id number]]  Normal              Final result                 Please view results for these tests on the individual orders.    COVID-19, [facility] IN-HOUSE,NP Swab (NO TRANSPORT MEDIA) 2 HR TAT - Swab, Nasopharynx [[id number]]  (Normal) Collected:  20    Specimen:  Swab from Nasopharynx Updated:  20     COVID19 Not Detected    Narrative:       Fact sheet for providers: https://www.fda.gov/media/769255/download     Fact sheet for patients: https://www.fda.gov/media/419263/download            Hospital problem list:    History of shoulder dystocia in prior pregnancy, currently pregnant    Delivery by planned , 37-39 wks due to labor, with postp compl    Pregnancy      Assessment/Plan     1. Intrauterine pregnancy at 39w0d    2.  Prior shoulder dystoica    3.  Contractions     Plan:  Primary        Reviewed procedure with patient.  I discussed the risks including but not limited to bleeding, infection and damage to internal organs.  Understanding of the procedure is voiced.      Oliverio Estrada MD  07/12/20  15:08      Patient Care Team:  Provider, No Known as PCP - General

## 2020-07-13 LAB
BACTERIA SPEC AEROBE CULT: ABNORMAL
BACTERIA SPEC AEROBE CULT: ABNORMAL
BASOPHILS # BLD AUTO: 0.05 10*3/MM3 (ref 0–0.2)
BASOPHILS NFR BLD AUTO: 0.4 % (ref 0–1.5)
DEPRECATED RDW RBC AUTO: 41.3 FL (ref 37–54)
EOSINOPHIL # BLD AUTO: 0.1 10*3/MM3 (ref 0–0.4)
EOSINOPHIL NFR BLD AUTO: 0.9 % (ref 0.3–6.2)
ERYTHROCYTE [DISTWIDTH] IN BLOOD BY AUTOMATED COUNT: 13 % (ref 12.3–15.4)
HCT VFR BLD AUTO: 24.1 % (ref 34–46.6)
HGB BLD-MCNC: 8.1 G/DL (ref 12–15.9)
IMM GRANULOCYTES # BLD AUTO: 0.07 10*3/MM3 (ref 0–0.05)
IMM GRANULOCYTES NFR BLD AUTO: 0.6 % (ref 0–0.5)
LYMPHOCYTES # BLD AUTO: 2.2 10*3/MM3 (ref 0.7–3.1)
LYMPHOCYTES NFR BLD AUTO: 19.3 % (ref 19.6–45.3)
MCH RBC QN AUTO: 29.3 PG (ref 26.6–33)
MCHC RBC AUTO-ENTMCNC: 33.6 G/DL (ref 31.5–35.7)
MCV RBC AUTO: 87.3 FL (ref 79–97)
MONOCYTES # BLD AUTO: 0.69 10*3/MM3 (ref 0.1–0.9)
MONOCYTES NFR BLD AUTO: 6 % (ref 5–12)
NEUTROPHILS NFR BLD AUTO: 72.8 % (ref 42.7–76)
NEUTROPHILS NFR BLD AUTO: 8.31 10*3/MM3 (ref 1.7–7)
NRBC BLD AUTO-RTO: 0 /100 WBC (ref 0–0.2)
PLATELET # BLD AUTO: 203 10*3/MM3 (ref 140–450)
PMV BLD AUTO: 11 FL (ref 6–12)
RBC # BLD AUTO: 2.76 10*6/MM3 (ref 3.77–5.28)
WBC # BLD AUTO: 11.42 10*3/MM3 (ref 3.4–10.8)

## 2020-07-13 PROCEDURE — 85025 COMPLETE CBC W/AUTO DIFF WBC: CPT | Performed by: OBSTETRICS & GYNECOLOGY

## 2020-07-13 PROCEDURE — 0503F POSTPARTUM CARE VISIT: CPT | Performed by: OBSTETRICS & GYNECOLOGY

## 2020-07-13 RX ORDER — OXYCODONE AND ACETAMINOPHEN 10; 325 MG/1; MG/1
1 TABLET ORAL EVERY 4 HOURS PRN
Status: DISCONTINUED | OUTPATIENT
Start: 2020-07-13 | End: 2020-07-14 | Stop reason: HOSPADM

## 2020-07-13 RX ADMIN — Medication 80 MG: at 21:56

## 2020-07-13 RX ADMIN — IBUPROFEN 800 MG: 800 TABLET ORAL at 07:58

## 2020-07-13 RX ADMIN — OXYCODONE HYDROCHLORIDE AND ACETAMINOPHEN 1 TABLET: 7.5; 325 TABLET ORAL at 03:06

## 2020-07-13 RX ADMIN — OXYCODONE HYDROCHLORIDE AND ACETAMINOPHEN 1 TABLET: 7.5; 325 TABLET ORAL at 16:48

## 2020-07-13 RX ADMIN — OXYCODONE HYDROCHLORIDE AND ACETAMINOPHEN 1 TABLET: 7.5; 325 TABLET ORAL at 21:56

## 2020-07-13 RX ADMIN — IBUPROFEN 800 MG: 800 TABLET ORAL at 16:48

## 2020-07-13 RX ADMIN — OXYCODONE HYDROCHLORIDE AND ACETAMINOPHEN 1 TABLET: 7.5; 325 TABLET ORAL at 12:27

## 2020-07-13 RX ADMIN — Medication 80 MG: at 12:27

## 2020-07-13 RX ADMIN — OXYCODONE HYDROCHLORIDE AND ACETAMINOPHEN 1 TABLET: 5; 325 TABLET ORAL at 07:50

## 2020-07-13 NOTE — PLAN OF CARE
Problem: Patient Care Overview  Goal: Plan of Care Review  Flowsheets (Taken 7/13/2020 6021)  Progress: improving  Plan of Care Reviewed With: patient  Outcome Summary: Stable, pain well controlled with po pain meds, ambulates in room/trejo without any problems, took shower this afternoon, voiding WNL

## 2020-07-13 NOTE — NURSING NOTE
Pt c/o's of right shoulder pain and hurts when taking a deep breath. O2 sats 100% on R/A, gave pt a mylicon pill and heating pad. Encouraged to get out in trejo with a mask and walk around Nursing Station. Educated on not using straws and no carbonation.

## 2020-07-13 NOTE — PROGRESS NOTES
[facility]   PROGRESS NOTE    Post-Op Day 1 S/P   Subjective     Patient reports:  C/o severe pain of right shoulder and right rib cage. Incisional pain is minimal.  She is   ambulating. Tolerating diet. Tolerating po -- normal.  Intake -- c/o of tolerating po solids.   Voiding - without difficulty; flatus reported..  Vaginal bleeding is light.       Objective      Vitals: Vital Signs Range for the last 24 hours  Temperature: Temp:  [96.3 °F (35.7 °C)-99.3 °F (37.4 °C)] 97.9 °F (36.6 °C)   Temp Source: Temp src: Oral   BP: BP: (104-123)/(59-83) 110/70   Pulse: Heart Rate:  [68-90] 76   Respirations: Resp:  [16-18] 16   SPO2: SpO2:  [96 %-100 %] 99 %   O2 Amount (l/min):     O2 Devices Device (Oxygen Therapy): room air   Weight: Weight:  [82.3 kg (181 lb 6.4 oz)] 82.3 kg (181 lb 6.4 oz)            Physical Exam    Lungs clear to auscultation bilaterally   Abdomen Soft NT ND, fundus-firm NT below umbilicus   Incision  Dressing intact, no drainage   Extremities extremities normal, atraumatic, no cyanosis or edema     I reviewed the patient's new clinical results.    Assessment/Plan        History of shoulder dystocia in prior pregnancy, currently pregnant    Delivery by planned , 37-39 wks due to labor, with postp compl    Pregnancy    S/P  section      Assessment:    Berry Daniels is Day 1  post-partum  , Low Transverse    .       Plan:  remove dressing, continue post op care and ambulation.        Janet Walker MD  2020  13:31

## 2020-07-13 NOTE — LACTATION NOTE
Mom states breastfeeding has been going well. She denies questions or concerns. She has a personal breast pump at home. Encouraged to call if needing any assistance.

## 2020-07-14 VITALS
SYSTOLIC BLOOD PRESSURE: 121 MMHG | RESPIRATION RATE: 16 BRPM | HEART RATE: 71 BPM | WEIGHT: 181.4 LBS | TEMPERATURE: 97.7 F | HEIGHT: 63 IN | DIASTOLIC BLOOD PRESSURE: 77 MMHG | OXYGEN SATURATION: 98 % | BODY MASS INDEX: 32.14 KG/M2

## 2020-07-14 RX ORDER — IBUPROFEN 800 MG/1
800 TABLET ORAL EVERY 8 HOURS PRN
Qty: 30 TABLET | Refills: 0 | Status: SHIPPED | OUTPATIENT
Start: 2020-07-14 | End: 2020-08-25

## 2020-07-14 RX ORDER — OXYCODONE HYDROCHLORIDE AND ACETAMINOPHEN 5; 325 MG/1; MG/1
1 TABLET ORAL EVERY 4 HOURS PRN
Qty: 30 TABLET | Refills: 0 | Status: SHIPPED | OUTPATIENT
Start: 2020-07-14 | End: 2020-07-22

## 2020-07-14 RX ORDER — FERROUS SULFATE 325(65) MG
325 TABLET ORAL
Qty: 60 TABLET | Refills: 1 | Status: SHIPPED | OUTPATIENT
Start: 2020-07-14 | End: 2020-08-25

## 2020-07-14 RX ORDER — LANOLIN
CREAM (ML) TOPICAL AS NEEDED
Status: DISCONTINUED | OUTPATIENT
Start: 2020-07-14 | End: 2020-07-14 | Stop reason: HOSPADM

## 2020-07-14 RX ADMIN — Medication 80 MG: at 08:21

## 2020-07-14 RX ADMIN — OXYCODONE HYDROCHLORIDE AND ACETAMINOPHEN 1 TABLET: 7.5; 325 TABLET ORAL at 02:07

## 2020-07-14 RX ADMIN — Medication: at 02:07

## 2020-07-14 RX ADMIN — IBUPROFEN 800 MG: 800 TABLET ORAL at 11:23

## 2020-07-14 RX ADMIN — IBUPROFEN 800 MG: 800 TABLET ORAL at 01:15

## 2020-07-14 RX ADMIN — OXYCODONE HYDROCHLORIDE AND ACETAMINOPHEN 1 TABLET: 7.5; 325 TABLET ORAL at 08:21

## 2020-07-14 NOTE — PLAN OF CARE
Problem: Patient Care Overview  Goal: Plan of Care Review  Outcome: Ongoing (interventions implemented as appropriate)  Flowsheets  Taken 2020 1659 by Janet Auguste RN  Progress: improving  Outcome Summary: Stable, pain well controlled with po pain meds, ambulates in room/trejo without any problems, took shower this afternoon, voiding WNL  Taken 20206 by Estefani Fenton RN  Plan of Care Reviewed With: patient;significant other  Note:   Routine care, pain controlled with po meds, ice pack for inc and heat for gas pain. Wants to d/c early.  Goal: Individualization and Mutuality  Outcome: Ongoing (interventions implemented as appropriate)  Goal: Discharge Needs Assessment  Outcome: Ongoing (interventions implemented as appropriate)  Goal: Interprofessional Rounds/Family Conf  Outcome: Ongoing (interventions implemented as appropriate)     Problem: Fall Risk,  (Adult,Obstetrics,Pediatric)  Goal: Identify Related Risk Factors and Signs and Symptoms  Outcome: Ongoing (interventions implemented as appropriate)  Goal: Absence of Maternal Fall  Outcome: Ongoing (interventions implemented as appropriate)  Goal: Absence of  Fall/Drop  Outcome: Ongoing (interventions implemented as appropriate)     Problem: Postpartum ( Delivery) (Adult,Obstetrics,Pediatric)  Goal: Signs and Symptoms of Listed Potential Problems Will be Absent, Minimized or Managed (Postpartum)  Outcome: Ongoing (interventions implemented as appropriate)  Goal: Anesthesia/Sedation Recovery  Outcome: Ongoing (interventions implemented as appropriate)     Problem: Anesthesia/Analgesia, Neuraxial (Obstetrics)  Goal: Signs and Symptoms of Listed Potential Problems Will be Absent, Minimized or Managed (Anesthesia/Analgesia, Neuraxial)  Outcome: Ongoing (interventions implemented as appropriate)

## 2020-07-14 NOTE — PROGRESS NOTES
[facility]   PROGRESS NOTE    Post-Op Day 2 S/P     Subjective   CC: post  section    Patient reports:  Does report she has some burning on her incision but it is improved with an ice pack.  Her pain is well controlled with oral medication.  Her bleeding is normal.  She is passing gas.  She is ambulating without assistance.    Review of systems- no shortness of breath, nausea or vomiting or leg pain.    Objective      Vitals: Vital Signs Range for the last 24 hours  Temperature: Temp:  [97.6 °F (36.4 °C)-98.6 °F (37 °C)] 97.7 °F (36.5 °C)       BP: BP: (107-121)/(67-77) 121/77   Pulse: Heart Rate:  [69-83] 71   Respirations: Resp:  [16-20] 16                            Physical exam   General-  no acute distress, conversant    Lungs- respirations unlabored   CV- trace LE edema   Abdomen- soft, nontender, nondistended.  Fundus is firm.   Incision -  Clean, dry, and intact with no erythema.   Lower extremities- nontender bilaterally    Results from last 7 days   Lab Units 20  0508   WBC 10*3/mm3 11.42*   HEMOGLOBIN g/dL 8.1*   HEMATOCRIT % 24.1*   PLATELETS 10*3/mm3 203         Assessment/Plan        History of shoulder dystocia in prior pregnancy, currently pregnant    Delivery by planned , 37-39 wks due to labor, with postp compl    Pregnancy    S/P  section      Assessment:    Berry Daniels is Day 2  post-op from      Patient desires discharged home today.  We discussed restrictions for lifting and pelvic rest.  We also encouraged ambulation to prevent blood clots.    Patient is significantly anemic with hemoglobin of 8.1.  Recommend iron twice daily.           John Dawn MD  2020  12:17

## 2020-07-14 NOTE — LACTATION NOTE
Mom reports baby is nursing well. Mom in BR at this time. Encouraged to call LC if needing assistance  Lactation Consult Note    Evaluation Completed: 2020 12:33  Patient Name: Berry Daniels  :  1990  MRN:  [id number]     REFERRAL  INFORMATION:                                         DELIVERY HISTORY:          Skin to skin initiation date/time: 2020      Skin to skin end date/time:              MATERNAL ASSESSMENT:                               INFANT ASSESSMENT:  Information for the patient's :  Danilo Daniels [[id number]]   No past medical history on file.                                                                                                                                MATERNAL INFANT FEEDING:                                                                       EQUIPMENT TYPE:                                 BREAST PUMPING:          LACTATION REFERRALS:

## 2020-07-14 NOTE — DISCHARGE SUMMARY
Section Discharge Summary    Date of Admission: 2020  Date of Discharge:  2020      Patient: Berry Daniels      MR#:[id number]    Surgeon/OB: Oliverio Estrada MD    Discharge Diagnosis:  section at 39w0d, uncomplicated recovery    Procedures:  , Low Transverse     2020    3:28 PM      Anesthesia:  Spinal     Presenting Problem/History of Present Illness  Delivery by planned , 37-39 wks due to labor, with postp compl [O75.82]  History of shoulder dystocia in prior pregnancy, currently pregnant [O09.299]  Pregnancy [Z34.90]  S/P  section [Z98.891]     Patient Active Problem List   Diagnosis   • Supervision of other normal pregnancy, antepartum   • Current mcclellan pregnancy with history of congenital anomaly in prior child, antepartum   • Positive RPR test   • History of shoulder dystocia in prior pregnancy, currently pregnant   • Subchorionic hemorrhage in first trimester   • Delivery by planned , 37-39 wks due to labor, with postp compl   • Genetic screening - negative prenatal screen    • Spotting affecting pregnancy in second trimester   • Abnormal glucose tolerance test (GTT) during pregnancy, antepartum   • Heartburn during pregnancy   • Pregnancy   • S/P  section       Hospital Course  Patient is a 30 y.o. female  at 39w0d status post  section with uneventful postoperative recovery.  Patient was advanced to regular diet on postoperative day#1.  On discharge, ambulating, tolerating a regular diet without any difficulties and her incision is dry, clean and intact.     Infant:   female  fetus 3100 g (6 lb 13.4 oz)  with Apgar scores of 8  , 8   at five minutes.    Condition on Discharge:  Stable    Vital Signs  Temp:  [97.6 °F (36.4 °C)-98.6 °F (37 °C)] 97.7 °F (36.5 °C)  Heart Rate:  [69-83] 71  Resp:  [16-20] 16  BP: (107-121)/(67-77) 121/77    Lab Results   Component Value Date    WBC 11.42 (H) 2020    HGB 8.1 (L)  07/13/2020    HCT 24.1 (L) 07/13/2020    MCV 87.3 07/13/2020     07/13/2020       Discharge Disposition  Home or Self Care    Discharge Medications     Your medication list      START taking these medications      Instructions Last Dose Given Next Dose Due   ferrous sulfate 325 (65 FE) MG tablet      Take 1 tablet by mouth Daily With Breakfast.       ibuprofen 800 MG tablet  Commonly known as:  ADVIL,MOTRIN      Take 1 tablet by mouth Every 8 (Eight) Hours As Needed for Mild Pain .       oxyCODONE-acetaminophen 5-325 MG per tablet  Commonly known as:  PERCOCET      Take 1 tablet by mouth Every 4 (Four) Hours As Needed for Moderate Pain  for up to 8 days.          CONTINUE taking these medications      Instructions Last Dose Given Next Dose Due   prenatal (CLASSIC) vitamin  tablet  Generic drug:  prenatal vitamin      Take  by mouth Daily.          STOP taking these medications    omeprazole 40 MG capsule  Commonly known as:  priLOSEC        ondansetron 8 MG tablet  Commonly known as:  Zofran              Where to Get Your Medications      These medications were sent to [facility] - [place], [place] - [address] [place] [address] [phone] [place] [id number] [id number]    Phone:  [phone]   · ferrous sulfate 325 (65 FE) MG tablet  · ibuprofen 800 MG tablet  · oxyCODONE-acetaminophen 5-325 MG per tablet           Discharge Diet: Regular    Follow-up Appointments  Future Appointments   Date Time Provider Department Center   7/20/2020 10:15 AM Corina Jackson APRN [facility] None         Prenatal labs/vax:   Immunization History   Administered Date(s) Administered   • Tdap 04/08/2020       External Prenatal Results     Pregnancy Outside Results - Transcribed From Office Records - See Scanned Records For Details     Test Value Date Time    Hgb 8.1 g/dL 07/13/20 0508      9.6 g/dL 07/12/20 1400      10.8 g/dL 04/08/20 0840      13.3 g/dL 12/18/19 1212    Hct 24.1 %  07/13/20 0508      28.4 % 07/12/20 1400      31.6 % 04/08/20 0840      39.1 % 12/18/19 1212    ABO O  07/12/20 1400    Rh Positive  07/12/20 1400    Antibody Screen Negative  07/12/20 1400      Negative  12/18/19 1212    Glucose Fasting GTT 93  04/15/20     Glucose Tolerance Test 1 hour 137  04/15/20     Glucose Tolerance Test 3 hour 113  04/15/20     Gonorrhea (discrete) Negative  12/18/19 1440    Chlamydia (discrete) Negative  12/18/19 1440    RPR Comment  04/08/20 0902      Comment  01/03/20 1034      Reactive  12/18/19 1212    VDRL       Syphilis Antibody Non Reactive  04/08/20 0902      Non Reactive  01/03/20 1034      Comment  12/27/19 0924    Rubella 10.40 index 12/18/19 1212    HBsAg Negative  12/18/19 1212    Herpes Simplex Virus PCR       Herpes Simplex VIrus Culture       HIV Non Reactive  12/18/19 1212    Hep C RNA Quant PCR       Hep C Antibody <0.1 s/co ratio 12/18/19 1212    AFP 33.1 ng/mL 02/19/20 0929    Group B Strep Negative  06/17/20 1145    GBS Susceptibility to Clindamycin       GBS Susceptibility to Erythromycin       Fetal Fibronectin       Genetic Testing, Maternal Blood             Drug Screening     Test Value Date Time    Urine Drug Screen       Amphetamine Screen       Barbiturate Screen       Benzodiazepine Screen       Methadone Screen       Phencyclidine Screen       Opiates Screen       THC Screen       Cocaine Screen       Propoxyphene Screen       Buprenorphine Screen       Methamphetamine Screen       Oxycodone Screen       Tricyclic Antidepressants Screen                     John Dawn MD  7/14/2020  12:28

## 2020-07-20 ENCOUNTER — POSTPARTUM VISIT (OUTPATIENT)
Dept: OBSTETRICS AND GYNECOLOGY | Age: 30
End: 2020-07-20

## 2020-07-20 VITALS
HEIGHT: 63 IN | SYSTOLIC BLOOD PRESSURE: 118 MMHG | DIASTOLIC BLOOD PRESSURE: 76 MMHG | BODY MASS INDEX: 29.77 KG/M2 | WEIGHT: 168 LBS

## 2020-07-20 PROBLEM — O26.852 SPOTTING AFFECTING PREGNANCY IN SECOND TRIMESTER: Status: RESOLVED | Noted: 2020-03-18 | Resolved: 2020-07-20

## 2020-07-20 PROCEDURE — 0503F POSTPARTUM CARE VISIT: CPT | Performed by: NURSE PRACTITIONER

## 2020-07-20 RX ORDER — DOCUSATE SODIUM 100 MG/1
100 CAPSULE, LIQUID FILLED ORAL 2 TIMES DAILY
COMMUNITY
End: 2020-08-25

## 2020-07-20 NOTE — PROGRESS NOTES
Patient presents for a postpartum visit- incision check. She is 1 week postpartum following a low cervical transverse  section. I have fully reviewed the prenatal and intrapartum course. The delivery was at 39 gestational weeks. Outcome: primary  section, low transverse incision. Anesthesia: spinal. Postpartum course has been uncomplicated. Baby's course has been uncomplicated. Baby is feeding by breast. Bleeding thin lochia. Bowel function is normal. Bladder function is normal.     Berry reports she is doing well- feeling better each day. No longer taking percocet- motrin only.  Reports moods are good  No concerns regarding incision- she has been monitoring    No complaints.    The following portions of the patient's history were reviewed and updated as appropriate: allergies, current medications, past family history, past medical history, past social history, past surgical history and problem list.    Review of Systems  Pertinent items are noted in HPI.        Vitals:    20 1032   BP: 118/76       General:  alert, appears stated age and cooperative    Breasts:  deferred   Lungs: clear to auscultation bilaterally   Heart:  regular rate and rhythm, S1, S2 normal, no murmur, click, rub or gallop   Abdomen: soft, non-tender; bowel sounds normal; no masses,  no organomegaly and low transverse incision CDI- no redness, drainage, swelling    No swelling, redness, tenderness LE bilaterally                               1 week postpartum visit.    Berry looks well and reports feeling well. Incision healing well.  Reinforced s/s infection. To call with any incisional changes/concerns, fevers/chills, heavy bleeding, increase in pain, changes in mood, or other concerns.  Contraception discussed- considering mirena. Brochure given.  Follow up for pp visit.    Corina Jackson, MARILEE

## 2020-08-12 ENCOUNTER — LAB (OUTPATIENT)
Dept: OBSTETRICS AND GYNECOLOGY | Age: 30
End: 2020-08-12

## 2020-08-12 DIAGNOSIS — R53.83 MALAISE AND FATIGUE: Primary | ICD-10-CM

## 2020-08-12 DIAGNOSIS — R53.81 MALAISE AND FATIGUE: Primary | ICD-10-CM

## 2020-08-13 LAB
ALBUMIN SERPL-MCNC: 4 G/DL (ref 3.9–5)
ALBUMIN/GLOB SERPL: 1.4 {RATIO} (ref 1.2–2.2)
ALP SERPL-CCNC: 74 IU/L (ref 39–117)
ALT SERPL-CCNC: 16 IU/L (ref 0–32)
AST SERPL-CCNC: 24 IU/L (ref 0–40)
BASOPHILS # BLD AUTO: 0.1 X10E3/UL (ref 0–0.2)
BASOPHILS NFR BLD AUTO: 1 %
BILIRUB SERPL-MCNC: <0.2 MG/DL (ref 0–1.2)
BUN SERPL-MCNC: 12 MG/DL (ref 6–20)
BUN/CREAT SERPL: 15 (ref 9–23)
CALCIUM SERPL-MCNC: 9.2 MG/DL (ref 8.7–10.2)
CHLORIDE SERPL-SCNC: 103 MMOL/L (ref 96–106)
CO2 SERPL-SCNC: 25 MMOL/L (ref 20–29)
CREAT SERPL-MCNC: 0.82 MG/DL (ref 0.57–1)
EOSINOPHIL # BLD AUTO: 0.3 X10E3/UL (ref 0–0.4)
EOSINOPHIL NFR BLD AUTO: 3 %
ERYTHROCYTE [DISTWIDTH] IN BLOOD BY AUTOMATED COUNT: 13 % (ref 11.7–15.4)
GLOBULIN SER CALC-MCNC: 2.8 G/DL (ref 1.5–4.5)
GLUCOSE SERPL-MCNC: 83 MG/DL (ref 65–99)
HCT VFR BLD AUTO: 36.7 % (ref 34–46.6)
HGB BLD-MCNC: 11.5 G/DL (ref 11.1–15.9)
IMM GRANULOCYTES # BLD AUTO: 0 X10E3/UL (ref 0–0.1)
IMM GRANULOCYTES NFR BLD AUTO: 0 %
LYMPHOCYTES # BLD AUTO: 3 X10E3/UL (ref 0.7–3.1)
LYMPHOCYTES NFR BLD AUTO: 39 %
MCH RBC QN AUTO: 27.6 PG (ref 26.6–33)
MCHC RBC AUTO-ENTMCNC: 31.3 G/DL (ref 31.5–35.7)
MCV RBC AUTO: 88 FL (ref 79–97)
MONOCYTES # BLD AUTO: 0.5 X10E3/UL (ref 0.1–0.9)
MONOCYTES NFR BLD AUTO: 7 %
NEUTROPHILS # BLD AUTO: 3.8 X10E3/UL (ref 1.4–7)
NEUTROPHILS NFR BLD AUTO: 50 %
PLATELET # BLD AUTO: 304 X10E3/UL (ref 150–450)
POTASSIUM SERPL-SCNC: 4.6 MMOL/L (ref 3.5–5.2)
PROT SERPL-MCNC: 6.8 G/DL (ref 6–8.5)
RBC # BLD AUTO: 4.17 X10E6/UL (ref 3.77–5.28)
SODIUM SERPL-SCNC: 138 MMOL/L (ref 134–144)
WBC # BLD AUTO: 7.6 X10E3/UL (ref 3.4–10.8)

## 2020-08-13 NOTE — PROGRESS NOTES
Notify patient: Screening labs appear within normal limits.  Suggest follow-up with her primary care doctor to address symptoms associated with her ears

## 2020-08-25 ENCOUNTER — POSTPARTUM VISIT (OUTPATIENT)
Dept: OBSTETRICS AND GYNECOLOGY | Age: 30
End: 2020-08-25

## 2020-08-25 VITALS
SYSTOLIC BLOOD PRESSURE: 106 MMHG | HEIGHT: 63 IN | BODY MASS INDEX: 30.83 KG/M2 | DIASTOLIC BLOOD PRESSURE: 67 MMHG | WEIGHT: 174 LBS

## 2020-08-25 DIAGNOSIS — Z30.09 ENCOUNTER FOR OTHER GENERAL COUNSELING OR ADVICE ON CONTRACEPTION: Primary | ICD-10-CM

## 2020-08-25 DIAGNOSIS — Z09 POSTOP CHECK: ICD-10-CM

## 2020-08-25 PROBLEM — R76.8 BIOLOGICAL FALSE POSITIVE RPR TEST: Status: RESOLVED | Noted: 2019-12-20 | Resolved: 2020-08-25

## 2020-08-25 PROBLEM — O46.8X1 SUBCHORIONIC HEMORRHAGE IN FIRST TRIMESTER: Status: RESOLVED | Noted: 2020-01-22 | Resolved: 2020-08-25

## 2020-08-25 PROBLEM — Z98.891 S/P CESAREAN SECTION: Status: RESOLVED | Noted: 2020-07-12 | Resolved: 2020-08-25

## 2020-08-25 PROBLEM — O99.810 ABNORMAL GLUCOSE TOLERANCE TEST (GTT) DURING PREGNANCY, ANTEPARTUM: Status: RESOLVED | Noted: 2020-04-09 | Resolved: 2020-08-25

## 2020-08-25 PROBLEM — Z13.79 GENETIC SCREENING: Status: RESOLVED | Noted: 2020-01-29 | Resolved: 2020-08-25

## 2020-08-25 PROBLEM — O41.8X10 SUBCHORIONIC HEMORRHAGE IN FIRST TRIMESTER: Status: RESOLVED | Noted: 2020-01-22 | Resolved: 2020-08-25

## 2020-08-25 PROBLEM — R76.8 BIOLOGICAL FALSE POSITIVE RPR TEST: Status: ACTIVE | Noted: 2019-12-20

## 2020-08-25 PROBLEM — O09.299 CURRENT SINGLETON PREGNANCY WITH HISTORY OF CONGENITAL ANOMALY IN PRIOR CHILD, ANTEPARTUM: Status: RESOLVED | Noted: 2019-12-18 | Resolved: 2020-08-25

## 2020-08-25 PROCEDURE — 0503F POSTPARTUM CARE VISIT: CPT | Performed by: OBSTETRICS & GYNECOLOGY

## 2020-08-25 NOTE — PROGRESS NOTES
Postop  Visit    2020    Patient: Berry Daniels          MR#:[id number]    History of Present Illness    30 y.o. female  status post  delivery   Patient presents visit without complaints reporting an uneventful recovery    The patient plans vasectomy eventually and is asking for urologist name.  The couple will use condoms in the interim.  ________________________________________  Patient Active Problem List   Diagnosis   • History of shoulder dystocia in prior pregnancy, currently pregnant       Past Medical History:   Diagnosis Date   • Anxiety     no medication   • Biological false positive RPR test 2019 Anticardiolipin, LAC and AntiSSA/SSB negative  FTA equivocal.    [X]  RPR and FTA needs repeat- NEGATIVE   • Current mcclellan pregnancy with history of congenital anomaly in prior child, antepartum 2019    Son dx with Meckel's diverticulum at age 3   • Genetic screening - negative prenatal screen 2020 Routine Maternal  genetic screening is negative for cystic fibrosis, spinal muscular atrophy, Duchenne's muscular dystrophy and fragile X disease    • GERD (gastroesophageal reflux disease)    • History of abnormal cervical Pap smear    • Spotting affecting pregnancy in second trimester 3/18/2020       Past Surgical History:   Procedure Laterality Date   •  SECTION N/A 2020    Procedure:  SECTION PRIMARY;  Surgeon: Oliverio Estrada MD;  Location: [facility] LABOR DELIVERY;  Service: Obstetrics/Gynecology;  Laterality: N/A;   • NO PAST SURGERIES         Social History     Tobacco Use   Smoking Status Former Smoker   • Types: Electronic Cigarette   Smokeless Tobacco Never Used   Tobacco Comment    1 year ago quit cigs       has a current medication list which includes the following prescription(s): docusate sodium, ferrous sulfate, ibuprofen, and prenatal (classic)  "vitamin.  ________________________________________  Review of Systems         Objective       /67   Ht 160 cm (63\")   Wt 78.9 kg (174 lb)   LMP 10/13/2019   Breastfeeding No   BMI 30.82 kg/m²    BP Readings from Last 3 Encounters:   20 106/67   20 118/76   20 121/77      Wt Readings from Last 3 Encounters:   20 78.9 kg (174 lb)   20 76.2 kg (168 lb)   20 82.3 kg (181 lb 6.4 oz)      BMI: Estimated body mass index is 30.82 kg/m² as calculated from the following:    Height as of this encounter: 160 cm (63\").    Weight as of this encounter: 78.9 kg (174 lb).    EXAM     General:     Patient appears well in NAD  Abdomen: Soft, NT, +BS, no acute findings  Pelvic:  Scant lochia  Incision: Dry, clean, intact healing without signs of infection  Ext:  No cyanosis, edema 1-2    Assessment:  Berry was seen today for postpartum care.    Diagnoses and all orders for this visit:    Encounter for other general counseling or advice on contraception    Postop check            Plan:  Return for Annual exam     Oliverio Estrada MD  2020 12:05  ________________________________________________________________  Delivery Details     Delivery: , Low Transverse     YOB: 2019    Time of Birth: 3:28 PM      Anesthesia: Spinal     Delivering clinician: Oliverio Estrada      Infant    Findings: female  infant     Infant observations: Weight: 3100 g (6 lb 13.4 oz)     Observations/Comments:  shikha 1      Apgars: 8   @ 1 minute /    8   @ 5 minutes         Estimated Blood Loss:    cc.     "

## 2021-01-06 ENCOUNTER — OFFICE VISIT (OUTPATIENT)
Dept: OBSTETRICS AND GYNECOLOGY | Age: 31
End: 2021-01-06

## 2021-01-06 VITALS
WEIGHT: 163 LBS | DIASTOLIC BLOOD PRESSURE: 74 MMHG | HEIGHT: 63 IN | SYSTOLIC BLOOD PRESSURE: 118 MMHG | BODY MASS INDEX: 28.88 KG/M2

## 2021-01-06 DIAGNOSIS — Z11.51 SCREENING FOR HUMAN PAPILLOMAVIRUS (HPV): ICD-10-CM

## 2021-01-06 DIAGNOSIS — Z12.4 SCREENING FOR MALIGNANT NEOPLASM OF CERVIX: ICD-10-CM

## 2021-01-06 DIAGNOSIS — Z01.419 WELL FEMALE EXAM WITH ROUTINE GYNECOLOGICAL EXAM: Primary | ICD-10-CM

## 2021-01-06 PROCEDURE — 99395 PREV VISIT EST AGE 18-39: CPT | Performed by: OBSTETRICS & GYNECOLOGY

## 2021-01-06 NOTE — PROGRESS NOTES
Routine Annual Visit    2021    Patient: Berry Daniels          MR#:[id number]    History of Present Illness    Chief Complaint   Patient presents with   • Gynecologic Exam     last pap 2019 neg       30 y.o. female  who presents for annual exam.    The patient presents for routine annual exam feeling well without complaints.  She is erratically using coitus  interruption for contraception but declines any form of formal contraception at this time        Patient's last menstrual period was 2020 (within days).  Obstetric History:  OB History        3    Para   2    Term   2            AB   1    Living   2       SAB   1    TAB        Ectopic        Molar        Multiple   0    Live Births   2               Menstrual History:     Patient's last menstrual period was 2020 (within days).       Sexual History:       ________________________________________  Patient Active Problem List   Diagnosis   • History of shoulder dystocia in prior pregnancy, currently pregnant     Past Medical History:   Diagnosis Date   • Anxiety     no medication   • Biological false positive RPR test 2019 Anticardiolipin, LAC and AntiSSA/SSB negative  FTA equivocal.    [X]  RPR and FTA needs repeat- NEGATIVE   • Current mcclellan pregnancy with history of congenital anomaly in prior child, antepartum 2019    Son dx with Meckel's diverticulum at age 3   • Genetic screening - negative prenatal screen 2020 Routine Maternal  genetic screening is negative for cystic fibrosis, spinal muscular atrophy, Duchenne's muscular dystrophy and fragile X disease    • GERD (gastroesophageal reflux disease)    • History of abnormal cervical Pap smear    • Spotting affecting pregnancy in second trimester 3/18/2020     Past Surgical History:   Procedure Laterality Date   •  SECTION N/A 2020    Procedure:  SECTION PRIMARY;  Surgeon: Oliverio Estrada MD;  " Location: [facility] LABOR DELIVERY;  Service: Obstetrics/Gynecology;  Laterality: N/A;   • NO PAST SURGERIES       Social History     Tobacco Use   Smoking Status Former Smoker   • Types: Electronic Cigarette   Smokeless Tobacco Never Used   Tobacco Comment    1 year ago quit cigs     Family History   Problem Relation Age of Onset   • Mitral valve prolapse Mother    • Breast cancer Maternal Grandmother    • Fibroids Maternal Grandmother    • Breast cancer Maternal Aunt    • Fibroids Maternal Aunt      Prior to Admission medications    Not on File     ________________________________________    Current contraception: coitus interruptus  History of abnormal Pap smear: no  Family history of uterine or ovarian cancer: no  Family History of colon cancer/colon polyps: no  History of abnormal mammogram: no  History of abnormal lipids: no    The following portions of the patient's history were reviewed and updated as appropriate: allergies, current medications, past family history, past medical history, past social history, past surgical history and problem list.    Review of Systems    Pertinent items are noted in HPI.       Objective   Physical Exam    /74   Ht 160 cm (63\")   Wt 73.9 kg (163 lb)   LMP 12/20/2020 (Within Days)   Breastfeeding No   BMI 28.87 kg/m²    BP Readings from Last 3 Encounters:   01/06/21 118/74   08/25/20 106/67   07/20/20 118/76      Wt Readings from Last 3 Encounters:   01/06/21 73.9 kg (163 lb)   08/25/20 78.9 kg (174 lb)   07/20/20 76.2 kg (168 lb)        BMI: Estimated body mass index is 28.87 kg/m² as calculated from the following:    Height as of this encounter: 160 cm (63\").    Weight as of this encounter: 73.9 kg (163 lb).       General: alert, appears stated age and cooperative   Heart: regular rate and rhythm, S1, S2 normal, no murmur, click, rub or gallop   Lungs: clear to auscultation bilaterally   Abdomen: soft, non-tender, without masses, no organomegaly   Breast: inspection " negative, no nipple discharge or bleeding, no masses or nodularity palpable   External genitalia/Vulva: External genitalia including bartholin's glands, Urethra, Inkerman's gland and urethra meatus are normal, Perineum, rectum and anus appear normal  and Bladder appears normal without significant prolapse    Vagina: normal mucosa, normal discharge   Cervix: multiparous appearance and no lesions   Uterus: normal size, mobile, non-tender or normal shape and consistency   Adnexa: normal adnexa     As part of wellness and prevention, the following topics were discussed with the patient:  Encouraged self breast exam  Physical activity and regular exercised encouraged.   Contraception discussed.       Assessment:    Diagnoses and all orders for this visit:    1. Well female exam with routine gynecological exam (Primary)  -     IGP, Rfx Aptima HPV ASCU    2. Screening for human papillomavirus (HPV)  -     IGP, Rfx Aptima HPV ASCU    3. Screening for malignant neoplasm of cervix  -     IGP, Rfx Aptima HPV ASCU        Plan:  Return in about 1 year (around 1/6/2022) for Annual GYN exam.      Oliverio Estrada MD  1/6/2021 11:23 EST

## 2021-04-16 ENCOUNTER — BULK ORDERING (OUTPATIENT)
Dept: CASE MANAGEMENT | Facility: OTHER | Age: 31
End: 2021-04-16

## 2021-04-16 DIAGNOSIS — Z23 IMMUNIZATION DUE: ICD-10-CM

## 2021-05-14 ENCOUNTER — TELEPHONE (OUTPATIENT)
Dept: OBSTETRICS AND GYNECOLOGY | Age: 31
End: 2021-05-14

## 2021-05-14 RX ORDER — NORETHINDRONE ACETATE AND ETHINYL ESTRADIOL 1MG-20(21)
1 KIT ORAL DAILY
Qty: 28 TABLET | Refills: 12 | Status: SHIPPED | OUTPATIENT
Start: 2021-05-14 | End: 2022-05-14

## 2021-05-17 NOTE — TELEPHONE ENCOUNTER
Called patient to inform her prescription has been sent to pharmacy; however, patient did not answer and voicemail not set up.

## 2022-03-03 ENCOUNTER — TELEPHONE (OUTPATIENT)
Dept: FAMILY MEDICINE CLINIC | Facility: CLINIC | Age: 32
End: 2022-03-03

## 2022-03-03 ENCOUNTER — TRANSITIONAL CARE MANAGEMENT TELEPHONE ENCOUNTER (OUTPATIENT)
Dept: CALL CENTER | Facility: HOSPITAL | Age: 32
End: 2022-03-03

## 2022-03-03 ENCOUNTER — READMISSION MANAGEMENT (OUTPATIENT)
Dept: CALL CENTER | Facility: HOSPITAL | Age: 32
End: 2022-03-03

## 2022-03-03 NOTE — OUTREACH NOTE
Prep Survey      Responses   [facility] facility patient discharged from? Non-BH   Is LACE score < 7 ? Non-BH Discharge   Emergency Room discharge w/ pulse ox? No   Eligibility [facility] and children[facility] ER    Date of Discharge 03/02/22   Discharge diagnosis Upper ABD pain   Does the patient have one of the following disease processes/diagnoses(primary or secondary)? Non-BH Discharge   Prep survey completed? Yes          Payal Panda RN

## 2022-03-03 NOTE — OUTREACH NOTE
Call Center TCM Note      Responses   [facility] patient discharged from? Non-   Does the patient have one of the following disease processes/diagnoses(primary or secondary)? Non- Discharge   TCM attempt successful? Yes   Call start time 1433   Call end time 1437   Discharge diagnosis Upper ABD pain   Meds reviewed with patient/caregiver? Yes   Is the patient having any side effects they believe may be caused by any medication additions or changes? No   Does the patient have all medications ordered at discharge? Yes   Is the patient taking all medications as directed (includes completed medication regime)? Yes   Does the patient have a primary care provider?  Yes   Does the patient have an appointment with their PCP within 7 days of discharge? Yes   Comments regarding PCP Hospital d/c f/u appt on 3/10/22 @10am   Has the patient kept scheduled appointments due by today? N/A   Has home health visited the patient within 72 hours of discharge? N/A   Psychosocial issues? No   Did the patient receive a copy of their discharge instructions? Yes   Nursing interventions Reviewed instructions with patient   What is the patient's perception of their health status since discharge? Same   Is the patient/caregiver able to teach back signs and symptoms related to disease process for when to call PCP? Yes   Is the patient/caregiver able to teach back signs and symptoms related to disease process for when to call 911? Yes   Is the patient/caregiver able to teach back the hierarchy of who to call/visit for symptoms/problems? PCP, Specialist, Home health nurse, Urgent Care, ED, 911 Yes   If the patient is a current smoker, are they able to teach back resources for cessation? Not a smoker  [vapes]   TCM call completed? Yes          PAWEL GUTIERREZ RN    3/3/2022, 14:37 EST

## 2022-03-03 NOTE — TELEPHONE ENCOUNTER
Caller: Berry Daniels    Relationship to patient: Self    Best call back number: [phone]    New or established patient?  [x] New  [] Established    Date of discharge: 03/02/22    Facility discharged from: [facility]    Diagnosis/Symptoms: UPPER ABDOMINAL PAIN    Length of stay (If applicable):N/A    Specialty Only: Did you see a [facility] health provider?    [] Yes  [] No

## 2022-04-21 ENCOUNTER — OFFICE VISIT (OUTPATIENT)
Dept: OBSTETRICS AND GYNECOLOGY | Age: 32
End: 2022-04-21

## 2022-04-21 VITALS
SYSTOLIC BLOOD PRESSURE: 116 MMHG | WEIGHT: 165.6 LBS | HEIGHT: 66 IN | BODY MASS INDEX: 26.61 KG/M2 | DIASTOLIC BLOOD PRESSURE: 80 MMHG

## 2022-04-21 DIAGNOSIS — Z01.419 WELL WOMAN EXAM WITH ROUTINE GYNECOLOGICAL EXAM: Primary | ICD-10-CM

## 2022-04-21 PROBLEM — O09.299 HISTORY OF SHOULDER DYSTOCIA IN PRIOR PREGNANCY, CURRENTLY PREGNANT: Status: RESOLVED | Noted: 2020-01-22 | Resolved: 2022-04-21

## 2022-04-21 PROCEDURE — 99395 PREV VISIT EST AGE 18-39: CPT | Performed by: NURSE PRACTITIONER

## 2022-04-21 PROCEDURE — 3008F BODY MASS INDEX DOCD: CPT | Performed by: NURSE PRACTITIONER

## 2022-04-21 RX ORDER — SUCRALFATE ORAL 1 G/10ML
1 SUSPENSION ORAL 4 TIMES DAILY
COMMUNITY
Start: 2022-03-02

## 2022-04-21 RX ORDER — PANTOPRAZOLE SODIUM 40 MG/1
40 TABLET, DELAYED RELEASE ORAL DAILY
COMMUNITY
Start: 2022-03-02

## 2022-04-21 NOTE — PROGRESS NOTES
[facility]  Routine Annual Visit    2022    Patient: Berry Daniels          MR#:[id number]      History of Present Illness    32 y.o. female  who presents for annual exam without complaint and no new medical history. Declines contraception.    Patient's last menstrual period was 2022 (approximate).  Obstetric History:  OB History        3    Para   2    Term   2            AB   1    Living   2       SAB   1    IAB        Ectopic        Molar        Multiple   0    Live Births   2               Menstrual History:     Patient's last menstrual period was 2022 (approximate).       Sexual History:       ________________________________________  Patient Active Problem List   Diagnosis   (none) - all problems resolved or deleted       Past Medical History:   Diagnosis Date   • Anxiety     no medication   • Biological false positive RPR test 2019 Anticardiolipin, LAC and AntiSSA/SSB negative  FTA equivocal.    [X]  RPR and FTA needs repeat- NEGATIVE   • Current mcclellan pregnancy with history of congenital anomaly in prior child, antepartum 2019    Son dx with Meckel's diverticulum at age 3   • Genetic screening - negative prenatal screen 2020 Routine Maternal  genetic screening is negative for cystic fibrosis, spinal muscular atrophy, Duchenne's muscular dystrophy and fragile X disease    • GERD (gastroesophageal reflux disease)    • History of abnormal cervical Pap smear    • History of shoulder dystocia in prior pregnancy, currently pregnant 2020 reportedly very difficult delivery with fractured clavicle and prolonged healing event for the patient     • Spotting affecting pregnancy in second trimester 3/18/2020       Past Surgical History:   Procedure Laterality Date   •  SECTION N/A 2020    Procedure:  SECTION PRIMARY;  Surgeon: Oliverio Estrada MD;  Location: [facility]  [place];  Service: Obstetrics/Gynecology;  Laterality: N/A;   • NO PAST SURGERIES         Social History     Tobacco Use   Smoking Status Former Smoker   • Types: Electronic Cigarette   Smokeless Tobacco Never Used   Tobacco Comment    1 year ago quit cigs       Family History   Problem Relation Age of Onset   • Mitral valve prolapse Mother    • Breast cancer Maternal Grandmother    • Fibroids Maternal Grandmother    • Breast cancer Maternal Aunt    • Fibroids Maternal Aunt        Prior to Admission medications    Medication Sig Start Date End Date Taking? Authorizing Provider   pantoprazole (PROTONIX) 40 MG EC tablet Take 40 mg by mouth Daily. 3/2/22  Yes Skyler Jarrett MD   norethindrone-ethinyl estradiol FE (Microgestin FE 1/20) 1-20 MG-MCG per tablet Take 1 tablet by mouth Daily. 5/14/21 5/14/22  Oliverio Estrada MD   sucralfate (CARAFATE) 1 GM/10ML suspension Take 1 g by mouth 4 (Four) Times a Day. 3/2/22   Skyler Jarrett MD     ________________________________________      The following portions of the patient's history were reviewed and updated as appropriate: allergies, current medications, past family history, past medical history, past social history, past surgical history and problem list.    Review of Systems   Constitutional: Negative.    HENT: Negative.    Eyes: Negative for visual disturbance.   Respiratory: Negative for cough, shortness of breath and wheezing.    Cardiovascular: Negative for chest pain, palpitations and leg swelling.   Gastrointestinal: Negative for abdominal distention, abdominal pain, blood in stool, constipation, diarrhea, nausea and vomiting.   Endocrine: Negative for cold intolerance and heat intolerance.   Genitourinary: Negative for difficulty urinating, dyspareunia, dysuria, frequency, genital sores, hematuria, menstrual problem, pelvic pain, urgency, vaginal bleeding, vaginal discharge and vaginal pain.   Musculoskeletal: Negative.    Skin: Negative.   "  Neurological: Negative for dizziness, weakness, light-headedness, numbness and headaches.   Hematological: Negative.    Psychiatric/Behavioral: Negative.    Breasts: negative for lumps skin changes, dimpling, swelling, nipple changes/discharge bilaterally         Objective   Physical Exam    /80   Ht 167.6 cm (66\")   Wt 75.1 kg (165 lb 9.6 oz)   LMP 04/05/2022 (Approximate)   Breastfeeding No   BMI 26.73 kg/m²    BP Readings from Last 3 Encounters:   04/21/22 116/80   12/31/21 141/93   01/06/21 118/74      Wt Readings from Last 3 Encounters:   04/21/22 75.1 kg (165 lb 9.6 oz)   12/31/21 68 kg (150 lb)   01/06/21 73.9 kg (163 lb)        BMI: Estimated body mass index is 26.73 kg/m² as calculated from the following:    Height as of this encounter: 167.6 cm (66\").    Weight as of this encounter: 75.1 kg (165 lb 9.6 oz).            General:   alert, appears stated age and cooperative   Heart: regular rate and rhythm, S1, S2 normal, no murmur, click, rub or gallop   Lungs: clear to auscultation bilaterally   Abdomen: soft, non-tender, without masses or organomegaly   Breast: inspection negative, no nipple discharge or bleeding, no masses or nodularity palpable   Vulva: External genitalia including bartholin's glands, Urethra, [place]'s gland and urethra meatus are normal, Perineum, rectum and anus appear normal  and Bladder appears normal without significant prolapse    Vagina: normal mucosa, normal discharge   Cervix: no cervical motion tenderness and no lesions   Uterus: normal size, mobile, non-tender and normal shape and consistency   Adnexa: no mass, fullness, tenderness     Assessment:    Diagnoses and all orders for this visit:    1. Well woman exam with routine gynecological exam (Primary)  -     IgP, Aptima HPV      Healthy lifestyle modifications discussed, counseled on self breast exams and bone health    All of the patient's questions were addressed and answered, I have encouraged her to call for " today's test results if she has not received them within 10 days.  Patient is advised to call with any change in her condition or with any other questions, otherwise return in 12 months for annual examination.      Corina Jackson, APRN  4/21/2022 13:45 EDT

## 2022-04-27 LAB
CYTOLOGIST CVX/VAG CYTO: NORMAL
CYTOLOGY CVX/VAG DOC CYTO: NORMAL
CYTOLOGY CVX/VAG DOC THIN PREP: NORMAL
DX ICD CODE: NORMAL
HIV 1 & 2 AB SER-IMP: NORMAL
HPV I/H RISK 4 DNA CVX QL PROBE+SIG AMP: NEGATIVE
OTHER STN SPEC: NORMAL
STAT OF ADQ CVX/VAG CYTO-IMP: NORMAL

## (undated) DEVICE — SUT MNCRYL 0/0 CTX 36IN Y398H

## (undated) DEVICE — ANTIBACTERIAL UNDYED BRAIDED (POLYGLACTIN 910), SYNTHETIC ABSORBABLE SUTURE: Brand: COATED VICRYL

## (undated) DEVICE — GLV SURG BIOGEL LTX PF 7 1/2

## (undated) DEVICE — SUT MNCRYL PLS ANTIB UD 4/0 SH 27IN

## (undated) DEVICE — NDL HYPO ECLPS SFTY 18G 1 1/2IN

## (undated) DEVICE — KT ART BLD GAS QUICK DRAW

## (undated) DEVICE — SOL IRR H2O BTL 1000ML STRL

## (undated) DEVICE — 3M(TM) TEGADERM(TM) TRANSPARENT FILM DRESSING FRAME STYLE 1627: Brand: 3M™ TEGADERM™